# Patient Record
Sex: MALE | Race: WHITE | NOT HISPANIC OR LATINO | Employment: OTHER | URBAN - METROPOLITAN AREA
[De-identification: names, ages, dates, MRNs, and addresses within clinical notes are randomized per-mention and may not be internally consistent; named-entity substitution may affect disease eponyms.]

---

## 2018-01-12 NOTE — PROGRESS NOTES
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 00) (H15 109)   9  Hematuria (599 70) (R31 9)   10  Hyperlipidemia (272 4) (E78 5)   11  Hypertension (401 9) (I10)   12  Hypothyroidism (244 9) (E03 9)   13  Special screening examination for neoplasm of prostate (V76 44) (Z12 5)    Current Meds   1  Aspirin 81 MG Oral Tablet Delayed Release; Therapy: 11GTO5972 to Recorded   2  BD Insulin Syringe Ultrafine 31G X 15/64" 0 5 ML Miscellaneous; Therapy: 91UWZ5244 to Recorded   3  Clopidogrel Bisulfate 75 MG Oral Tablet; take 1 tablet by mouth daily; Therapy: 54NTI4172 to (Evaluate:27Kcn4544)  Requested for: 79DOX5467; Last   [id number] Ordered   4  Crestor 40 MG Oral Tablet; Therapy: 66FXY3735 to Recorded   5  Glucagon Emergency 1 MG Injection Kit; Therapy: 34YWU3843 to Recorded   6  Levothyroxine Sodium 50 MCG Oral Tablet; TAKE 1 TABLET DAILY; Therapy: 58FPJ6946 to (Evaluate:81Ovj0243)  Requested for: 35OVF6841; Last   Rx:2016 Ordered   7  Losartan Potassium 25 MG Oral Tablet; Take 1 tablet daily; Therapy: 76HQF7035 to ([phone])  Requested for: 23RFO6197; Last   Rx:34Uzi5050 Ordered   8  Metoprolol Tartrate 25 MG Oral Tablet; take 1 tablet by mouth twice a day; Therapy: 33MCN4359 to (Evaluate:26Ocq5117) Recorded   9  NovoLOG 100 UNIT/ML Subcutaneous Solution; Therapy: 57Wac0192 to [phone])  Requested for: 60XFA9858 Recorded   10  Omeprazole 20 MG Oral Capsule Delayed Release;  Take one capsule once daily before    eating; Therapy: 34FSC4532 to (Evaluate:82Uof7660)  Requested for: 52QOO9132; Last    Rx:04Jan2016 Ordered   11  OneTouch Ultra Blue In [employer]; Therapy: 14Apr2011 to [phone])  Requested for: 89AHK8177 Recorded    Allergies    1   No Known Drug Allergies    Plan  Diphtheria-pertussis-tetanus (DPT) vaccination administered at current visit    · Adacel 5-2-15 5 LF-MCG/0 5 Intramuscular Suspension    Signatures   Electronically signed by : KERRY Bradley ; Jul 5 2016  4:39PM EST                       (Author)

## 2018-09-19 ENCOUNTER — TELEPHONE (OUTPATIENT)
Dept: UROLOGY | Facility: AMBULATORY SURGERY CENTER | Age: 73
End: 2018-09-19

## 2018-09-19 NOTE — TELEPHONE ENCOUNTER
Called Dr Vasu Lucas office hoping that we would not require release since we have Care Everywhere with [facility] - but they still requested a release to be faxed  Still have not received release

## 2018-09-19 NOTE — TELEPHONE ENCOUNTER
----- Message from Checo Paredes sent at 9/14/2018  4:51 PM EDT -----  [facility]AS[facility] 2nd opinion CAP appt - former Dr Junita Severance PT  Release is to be sent over to [facility]   Fax to Dr Desiree Stacy office ([phone] fax and [phone] phone) and ask to fax records to [phone]

## 2018-09-20 NOTE — TELEPHONE ENCOUNTER
Left message for PT asking for a call back to see if he filled out release or if he could go to Dr Randy Roque office to fill out a release or get records

## 2018-09-25 NOTE — TELEPHONE ENCOUNTER
Release received and filled out/scanned into PT's chart under media  Release was faxed to [phone] by Patrizia Burnett on 9/24/18 with received confirmation  I've attempted to call Dr Bhavana Xiao office two times with no answer  I will try again to obtain records prior to appointment scheduled next week

## 2018-09-26 NOTE — TELEPHONE ENCOUNTER
Called [facility] and  stated that request was sent to [address]  Called ([phone]) and spoke with representative who stated there is a request being processed but she is unable to see if it is for our office  She asked I fax request again to [phone] and write that it is STAT and it is the 2nd request we are sending   I did so and also wrote to fax records to [phone]

## 2018-09-28 NOTE — TELEPHONE ENCOUNTER
Called [facility]  They stated they have nothing to do with the records other than submitting the request to [address]  I have called [facility] multiple times and they have stated they are being processed but we still have not received anything  Called PT and left a message asking if he could come in on 10/5/18 with Dr David Luz instead at 2:30pm because we need more time for them to send records  Waiting for call back to see if PT can move his appt

## 2018-10-02 ENCOUNTER — OFFICE VISIT (OUTPATIENT)
Dept: UROLOGY | Facility: CLINIC | Age: 73
End: 2018-10-02
Payer: MEDICARE

## 2018-10-02 VITALS — WEIGHT: 179 LBS | HEIGHT: 69 IN | BODY MASS INDEX: 26.51 KG/M2

## 2018-10-02 DIAGNOSIS — C61 MALIGNANT NEOPLASM OF PROSTATE (HCC): Primary | ICD-10-CM

## 2018-10-02 PROCEDURE — 99204 OFFICE O/P NEW MOD 45 MIN: CPT | Performed by: UROLOGY

## 2018-10-02 RX ORDER — ROSUVASTATIN CALCIUM 40 MG/1
TABLET, COATED ORAL
COMMUNITY
Start: 2014-02-14

## 2018-10-02 RX ORDER — OMEPRAZOLE 20 MG/1
CAPSULE, DELAYED RELEASE ORAL
COMMUNITY
Start: 2016-01-04

## 2018-10-02 RX ORDER — KETOCONAZOLE 20 MG/G
CREAM TOPICAL
COMMUNITY
Start: 2018-09-14 | End: 2021-11-29

## 2018-10-02 RX ORDER — CLOTRIMAZOLE AND BETAMETHASONE DIPROPIONATE 10; .64 MG/G; MG/G
CREAM TOPICAL
COMMUNITY
Start: 2018-02-19 | End: 2021-11-29

## 2018-10-02 RX ORDER — CHOLECALCIFEROL (VITAMIN D3) 125 MCG
50000 TABLET ORAL
COMMUNITY

## 2018-10-02 RX ORDER — LEVOTHYROXINE SODIUM 0.05 MG/1
TABLET ORAL
COMMUNITY
Start: 2018-09-14 | End: 2021-11-29 | Stop reason: SDUPTHER

## 2018-10-02 RX ORDER — CLOPIDOGREL BISULFATE 75 MG/1
TABLET ORAL
COMMUNITY
Start: 2017-12-01 | End: 2021-11-29 | Stop reason: SDUPTHER

## 2018-10-02 NOTE — LETTER
October 3, 2018     Dailyidalmis BowersDorina, [address] [facility] [address]  [address] [place] [address],[address] [facility]    Patient: Yeni Shankar   YOB: 1945   Date of Visit: 10/2/2018       Dear Dr Patrizia Pablo: Thank you for referring Brittney Anne to me for evaluation  Below are my notes for this consultation  If you have questions, please do not hesitate to call me  I look forward to following your patient along with you  Sincerely,        Silva Cao MD        CC: No Recipients  Silva Cao MD  10/3/2018  8:33 AM  Sign at close encounter  Progress Note - Urology  Yeni Shankar 68 y o  male MRN: [id number]  Encounter: [id number]      Chief Complaint:   Chief Complaint   Patient presents with    Other     New patient - 2nd Opinion (prostate cancer)    Other     Pt was not able to give a urine sample today  HPI:   this is a 56-year-old male who was referred to Urology for elevated PSA of 4 9  Subsequently evaluation revealed [place] 7 adenocarcinoma of the prostate with 3 of 12 cores  His Prolaris test indicating intermediate risk  AUA risk profile also intermediate  He has additional single core of questionable significance as well  He has multiple comorbidities  Including insulin-dependent diabetes and coronary artery disease  He states that there is erectile dysfunction at this time  He does not have a partner  In addition he has voiding dysfunction with episodes of urgency incontinence  He denies weight loss or bone pain  Staging studies indicate no evidence of arie disease  The prostate cancer appears organ confined  Options of management have been detailed previously to this patient by his initial urologist  He is a retired  and expresses some indication that he understands medical terminology to some degree  In view of the pathologic findings I did not think he was best suited for active surveillance    We again reviewed options of radiation therapy which would include androgen deprivation therapy which was again outlined to the patient with the side effects and risks associated with that  We talked about standard IMRT as well as proton beam therapy as well as CyberKnife therapy  I stated that I felt that he was a higher risk for brachytherapy  He also brought up the possibility of high intensity focused ultrasound  This modality is not available in the [place] on a standard basis  We briefly talked about cryoablation as well  Finally, we discussed surgical intervention with a laparoscopic robotic prostatectomy  We discussed the risks and complications thereof  We discussed the sexual dysfunction which he already displays  We discussed urinary incontinence again, a which is a problem that he has with urgency leakage  He has not had a full evaluation for this  Although cystoscopy did not indicate significant obstruction  He has not had urodynamic studies  We discussed this briefly as well  He is aware of cancer statistics, he did bring a copy of the narrative from his initial urologist which details statistics at length  We discussed that should he be a surgical candidate he would need medical clearance from cardiology as well as Internal Medicine  Opportunity was given for questions and review of his pathology and treatment  He expressed concern of a need for colonoscopy as well as luis anal irritation  I encouraged him to have a colorectal opinion in this regard  I also encouraged him to discuss radiation therapy with that specialty as well  Some of his concern in that regard is that he has bowel dysfunction with episodic diarrhea        MEDS:    Current Outpatient Prescriptions:     aspirin 81 MG tablet, Take 81 mg by mouth, Disp: , Rfl:     clopidogrel (PLAVIX) 75 mg tablet, TAKE 1 TABLET BY MOUTH EVERY DAY, Disp: , Rfl:     clotrimazole-betamethasone (LOTRISONE) 1-0 05 % cream, Apply topically, Disp: , Rfl:     Dapagliflozin Propanediol 10 MG TABS, Take 10 mg by mouth, Disp: , Rfl:     glucagon (GLUCAGON EMERGENCY) 1 MG injection, Inject as directed, Disp: , Rfl:     insulin aspart (NovoLOG) 100 units/mL injection, Inject 100 Units/mL under the skin, Disp: , Rfl:     ketoconazole (NIZORAL) 2 % cream, Apply topically, Disp: , Rfl:     levothyroxine 50 mcg tablet, 1 tablet daily- 2 pills Saturday and sunday, Disp: , Rfl:     metoprolol tartrate (LOPRESSOR) 25 mg tablet, Take 25 mg by mouth, Disp: , Rfl:     omeprazole (PRILOSEC) 20 mg delayed release capsule, Reported on 2/14/2017 , Disp: , Rfl:     rosuvastatin (CRESTOR) 40 MG tablet, Take by mouth, Disp: , Rfl:     Ergocalciferol (VITAMIN D2) 2000 units TABS, Take 50,000 Units by mouth, Disp: , Rfl:       PMH:  No past medical history on file  PSH  No past surgical history on file  ROS:  Review of Systems      Vitals:  Height 5' 9" (1 753 m), weight 81 2 kg (179 lb)  Physical Exam:     General Appearance    80-year-old male appearing his stated age in no obvious distress at this time  We did not proceed with a physical examination at this time because of his request to avoid rectal examination in view of his perianal problems at this time  Lab, Imaging and other studies:  No results found for this or any previous visit (from the past 672 hour(s))  IMPRESSION:    T1c [place]elia 7 adenocarcinoma of the prostate    PLAN:   recommendation is to consider treatment for this disease  Recommendation to obtain cardiac and medical clearance which may help him make a decision on management as he has not committed to a treatment modality at this time  He will obtain a colorectal consult  40 minutes the time was spent in review of his records which he presented at the time of his visit  We then discussed the presentation, the pathology, and options of management after review of these records  I counseled the patient regarding opinion for treatment    The patient was given opportunity for review and questions  It is of note that my opinion regarding treatment is similar to that of his initial recommendation  He does display increased risk for surgical management but if cleared medically he may wish to follow this treatment pathway

## 2018-10-03 PROBLEM — E11.9 DIABETES (HCC): Status: ACTIVE | Noted: 2018-10-03

## 2018-10-03 PROBLEM — I51.9 HEART DISEASE: Status: ACTIVE | Noted: 2018-10-03

## 2018-10-03 NOTE — PROGRESS NOTES
Progress Note - Urology  Dayanara Ramsay 68 y o  male MRN: [id number]  Encounter: [id number]      Chief Complaint:   Chief Complaint   Patient presents with    Other     New patient - 2nd Opinion (prostate cancer)    Other     Pt was not able to give a urine sample today  HPI:   this is a 80-year-old male who was referred to Urology for elevated PSA of 4 9  Subsequently evaluation revealed [place] 7 adenocarcinoma of the prostate with 3 of 12 cores  His Prolaris test indicating intermediate risk  AUA risk profile also intermediate  He has additional single core of questionable significance as well  He has multiple comorbidities  Including insulin-dependent diabetes and coronary artery disease  He states that there is erectile dysfunction at this time  He does not have a partner  In addition he has voiding dysfunction with episodes of urgency incontinence  He denies weight loss or bone pain  Staging studies indicate no evidence of arie disease  The prostate cancer appears organ confined  Options of management have been detailed previously to this patient by his initial urologist  He is a retired  and expresses some indication that he understands medical terminology to some degree  In view of the pathologic findings I did not think he was best suited for active surveillance  We again reviewed options of radiation therapy which would include androgen deprivation therapy which was again outlined to the patient with the side effects and risks associated with that  We talked about standard IMRT as well as proton beam therapy as well as CyberKnife therapy  I stated that I felt that he was a higher risk for brachytherapy  He also brought up the possibility of high intensity focused ultrasound  This modality is not available in the [place] on a standard basis  We briefly talked about cryoablation as well    Finally, we discussed surgical intervention with a laparoscopic robotic prostatectomy  We discussed the risks and complications thereof  We discussed the sexual dysfunction which he already displays  We discussed urinary incontinence again, a which is a problem that he has with urgency leakage  He has not had a full evaluation for this  Although cystoscopy did not indicate significant obstruction  He has not had urodynamic studies  We discussed this briefly as well  He is aware of cancer statistics, he did bring a copy of the narrative from his initial urologist which details statistics at length  We discussed that should he be a surgical candidate he would need medical clearance from cardiology as well as Internal Medicine  Opportunity was given for questions and review of his pathology and treatment  He expressed concern of a need for colonoscopy as well as luis anal irritation  I encouraged him to have a colorectal opinion in this regard  I also encouraged him to discuss radiation therapy with that specialty as well  Some of his concern in that regard is that he has bowel dysfunction with episodic diarrhea        MEDS:    Current Outpatient Prescriptions:     aspirin 81 MG tablet, Take 81 mg by mouth, Disp: , Rfl:     clopidogrel (PLAVIX) 75 mg tablet, TAKE 1 TABLET BY MOUTH EVERY DAY, Disp: , Rfl:     clotrimazole-betamethasone (LOTRISONE) 1-0 05 % cream, Apply topically, Disp: , Rfl:     Dapagliflozin Propanediol 10 MG TABS, Take 10 mg by mouth, Disp: , Rfl:     glucagon (GLUCAGON EMERGENCY) 1 MG injection, Inject as directed, Disp: , Rfl:     insulin aspart (NovoLOG) 100 units/mL injection, Inject 100 Units/mL under the skin, Disp: , Rfl:     ketoconazole (NIZORAL) 2 % cream, Apply topically, Disp: , Rfl:     levothyroxine 50 mcg tablet, 1 tablet daily- 2 pills Saturday and sunday, Disp: , Rfl:     metoprolol tartrate (LOPRESSOR) 25 mg tablet, Take 25 mg by mouth, Disp: , Rfl:     omeprazole (PRILOSEC) 20 mg delayed release capsule, Reported on 2/14/2017 , Disp: , Rfl:     rosuvastatin (CRESTOR) 40 MG tablet, Take by mouth, Disp: , Rfl:     Ergocalciferol (VITAMIN D2) 2000 units TABS, Take 50,000 Units by mouth, Disp: , Rfl:       PMH:  No past medical history on file  PSH  No past surgical history on file  ROS:  Review of Systems      Vitals:  Height 5' 9" (1 753 m), weight 81 2 kg (179 lb)  Physical Exam:     General Appearance    67-year-old male appearing his stated age in no obvious distress at this time  We did not proceed with a physical examination at this time because of his request to avoid rectal examination in view of his perianal problems at this time  Lab, Imaging and other studies:  No results found for this or any previous visit (from the past 672 hour(s))  IMPRESSION:    T1c [place] 7 adenocarcinoma of the prostate    PLAN:   recommendation is to consider treatment for this disease  Recommendation to obtain cardiac and medical clearance which may help him make a decision on management as he has not committed to a treatment modality at this time  He will obtain a colorectal consult  40 minutes the time was spent in review of his records which he presented at the time of his visit  We then discussed the presentation, the pathology, and options of management after review of these records  I counseled the patient regarding opinion for treatment  The patient was given opportunity for review and questions  It is of note that my opinion regarding treatment is similar to that of his initial recommendation  He does display increased risk for surgical management but if cleared medically he may wish to follow this treatment pathway

## 2018-10-22 ENCOUNTER — LAB REQUISITION (OUTPATIENT)
Dept: LAB | Facility: HOSPITAL | Age: 73
End: 2018-10-22
Payer: MEDICARE

## 2018-10-22 DIAGNOSIS — K57.30 DIVERTICULOSIS OF LARGE INTESTINE WITHOUT PERFORATION OR ABSCESS WITHOUT BLEEDING: ICD-10-CM

## 2018-10-22 DIAGNOSIS — K55.20 ANGIODYSPLASIA OF COLON WITHOUT HEMORRHAGE: ICD-10-CM

## 2018-10-22 DIAGNOSIS — Z83.71 FAMILY HISTORY OF COLONIC POLYPS: ICD-10-CM

## 2018-10-22 DIAGNOSIS — Z86.010 HISTORY OF COLONIC POLYPS: ICD-10-CM

## 2018-10-22 PROCEDURE — 88305 TISSUE EXAM BY PATHOLOGIST: CPT | Performed by: PATHOLOGY

## 2018-11-02 ENCOUNTER — OFFICE VISIT (OUTPATIENT)
Dept: UROLOGY | Facility: CLINIC | Age: 73
End: 2018-11-02
Payer: MEDICARE

## 2018-11-02 VITALS
BODY MASS INDEX: 26.96 KG/M2 | HEIGHT: 69 IN | SYSTOLIC BLOOD PRESSURE: 138 MMHG | DIASTOLIC BLOOD PRESSURE: 76 MMHG | WEIGHT: 182 LBS | HEART RATE: 50 BPM

## 2018-11-02 DIAGNOSIS — C61 MALIGNANT NEOPLASM OF PROSTATE (HCC): Primary | ICD-10-CM

## 2018-11-02 PROCEDURE — 99213 OFFICE O/P EST LOW 20 MIN: CPT | Performed by: UROLOGY

## 2018-11-02 RX ORDER — INFLUENZA A VIRUSA/MICHIGAN/45/2015 X-275 (H1N1) ANTIGEN (FORMALDEHYDE INACTIVATED), INFLUENZA A VIRUS A/HONG KONG/4801/2014 X-263B (H3N2) ANTIGEN (FORMALDEHYDE INACTIVATED), AND INFLUENZA B VIRUS B/BRISBANE/60/2008 ANTIGEN (FORMALDEHYDE INACTIVATED) 60; 60; 60 UG/.5ML; UG/.5ML; UG/.5ML
INJECTION, SUSPENSION INTRAMUSCULAR
Refills: 0 | COMMUNITY
Start: 2018-09-29 | End: 2019-03-27 | Stop reason: ALTCHOICE

## 2018-11-02 RX ORDER — LOSARTAN POTASSIUM 25 MG/1
25 TABLET ORAL 2 TIMES DAILY
Refills: 1 | COMMUNITY
Start: 2018-10-22

## 2018-11-02 NOTE — PROGRESS NOTES
Progress Note - Urology  Ariana Jesup Sobia 68 y o  male MRN: [id number]  Encounter: [id number]      Chief Complaint:   Chief Complaint   Patient presents with    Prostate Cancer     Follow Up - Review colonoscopy results    Other     Pt was not able to give a urine sample today  HPI:   79-year-old male with history of prostate cancer presents for follow-up discussion  He has not yet been able to decide on treatment  We had again reviewed the options  This includes 1  Active surveillance in which case I would follow him on a 4 to six-month basis with digital rectal examination and PSA  Pending these results the decision would be re-evaluated  2  Radiation therapy  Again this was reviewed and he was offered to see radiation oncology for further discussion  3  Surgical intervention  We discussed the risk and complications again with this  Opportunity was given for him to question and he was given answers for those questions    He is not in any immediate distress at this time      MEDS:    Current Outpatient Prescriptions:     aspirin 81 MG tablet, Take 81 mg by mouth, Disp: , Rfl:     clopidogrel (PLAVIX) 75 mg tablet, TAKE 1 TABLET BY MOUTH EVERY DAY, Disp: , Rfl:     clotrimazole-betamethasone (LOTRISONE) 1-0 05 % cream, Apply topically, Disp: , Rfl:     Dapagliflozin Propanediol 10 MG TABS, Take 10 mg by mouth, Disp: , Rfl:     Ergocalciferol (VITAMIN D2) 2000 units TABS, Take 50,000 Units by mouth, Disp: , Rfl:     FLUZONE HIGH-DOSE 0 5 ML KOJO, TO BE ADMINISTERED BY PHARMACIST FOR IMMUNIZATION, Disp: , Rfl: 0    glucagon (GLUCAGON EMERGENCY) 1 MG injection, Inject as directed as needed  , Disp: , Rfl:     insulin aspart (NovoLOG) 100 units/mL injection, Inject 100 Units/mL under the skin Pump , Disp: , Rfl:     ketoconazole (NIZORAL) 2 % cream, Apply topically, Disp: , Rfl:     levothyroxine 50 mcg tablet, 1 tablet daily- 2 pills Saturday and sunday, Disp: , Rfl:     losartan (COZAAR) 25 mg tablet, Take 25 mg by mouth 2 (two) times a day, Disp: , Rfl: 1    metoprolol tartrate (LOPRESSOR) 25 mg tablet, Take 25 mg by mouth, Disp: , Rfl:     rosuvastatin (CRESTOR) 40 MG tablet, Take by mouth, Disp: , Rfl:     omeprazole (PRILOSEC) 20 mg delayed release capsule, Reported on 2/14/2017 , Disp: , Rfl:       PMH:  No past medical history on file  PSH  No past surgical history on file  ROS:  Review of Systems      Vitals:  Blood pressure 138/76, pulse (!) 50, height 5' 9" (1 753 m), weight 82 6 kg (182 lb)  Physical Exam:    no clinical examination performed at this time  The patient presented for discussion of his options  It review of the pathology and questions regarding treatment       Lab, Imaging and other studies:  Recent Results (from the past 672 hour(s))   Tissue Exam    Collection Time: 10/22/18  8:00 AM   Result Value Ref Range    Case Report       Surgical Pathology Report                         Case: [id number]                                   Authorizing Provider:  Eloina Valdivia MD      Collected:           10/22/2018 0800              Pathologist:           Finn Mott MD            Received:            10/22/2018 1430              Specimens:   A) - Large Intestine, Sigmoid Colon                                                                 B) - Rectum                                                                                Final Diagnosis       A  Large Intestine, Sigmoid Colon, :  -Fragments of tubular adenoma   -No evidence of high grade dysplasia or malignancy    B  Rectum, :  -Hyperplastic polyp   -No evidence of adenomatous change or malignancy  Note       Interpretation performed at [facility], [address] [facility]      Additional Information       All controls performed with the immunohistochemical stains reported above reacted appropriately    These tests were developed and their performance characteristics determined by [facility] or [facility]  They may not be cleared or approved by the [place]  Food and Drug Administration  The FDA has determined that such clearance or approval is not necessary  These tests are used for clinical purposes  They should not be regarded as investigational or for research  This laboratory has been approved by CLIA 88, designated as a high-complexity laboratory and is qualified to perform these tests  Gross Description       A  The specimen is received in formalin, labeled with the patient's name and hospital number, and is designated "sigmoid colon polyp  The specimen consists of 2 tan-pink, rubbery, irregularly shaped, focally fragmented polypoid portions of tissue measuring 0 2 and 0 8 cm in greatest dimension  The specimen is entirely submitted in 1 cassette  B  The specimen is received in formalin, labeled with the patient's name and hospital number, and is designated "rectal polyp  The specimen consists of a single tan-pink, rubbery, irregularly shaped polypoid portion of tissue measuring 0 3 cm in greatest dimension  The specimen is entirely submitted in 1 cassette  Note: The estimated total formalin fixation time based upon information provided by the submitting clinician and the standard processing schedule is under 72 hours  David             IMPRESSION:   Camelia 7 T1c adenocarcinoma the prostate    PLAN:   again, having reviewed options the patient will take this under advisement  I will recheck him in 4 months with a PSA prior to visit  PSA was 4 9 at the time of diagnosis

## 2019-03-04 ENCOUNTER — OFFICE VISIT (OUTPATIENT)
Dept: UROLOGY | Facility: CLINIC | Age: 74
End: 2019-03-04
Payer: MEDICARE

## 2019-03-04 VITALS
SYSTOLIC BLOOD PRESSURE: 118 MMHG | WEIGHT: 181 LBS | HEIGHT: 69 IN | DIASTOLIC BLOOD PRESSURE: 70 MMHG | BODY MASS INDEX: 26.81 KG/M2 | HEART RATE: 60 BPM

## 2019-03-04 DIAGNOSIS — C61 MALIGNANT NEOPLASM OF PROSTATE (HCC): Primary | ICD-10-CM

## 2019-03-04 PROCEDURE — 99213 OFFICE O/P EST LOW 20 MIN: CPT | Performed by: PHYSICIAN ASSISTANT

## 2019-03-04 NOTE — PROGRESS NOTES
UROLOGY PROGRESS NOTE   Patient Identifiers: Farzaneh Nolen (MRN [id number])  Date of Service: 3/4/2019    Subjective:      80-year-old man with a history of prostate cancer  He had a prostate biopsy in 2018 which showed 3 cores positive for adenocarcinoma  One was 3+ 4 equal 7 involving 20 percent of the specimen  He other 2 were Camelia 6 involving 4 percent and 90 percent of the specimen  His Prolaris  score was intermediate  After discussions with Dr Pema Zhu and Dr Trip Ramey he has not chosen any treatment at this time  His current PSA is 5 44  Previous PSA was 4 90  His CT scan showed small bladder stones  as well  Patient has  no complaints  Objective:     VITALS:    Vitals:    03/04/19 1057   BP: 118/70   Pulse: 60     AUA SYMPTOM SCORE      Most Recent Value   AUA SYMPTOM SCORE   How often have you had a sensation of not emptying your bladder completely after you finished urinating? 2   How often have you had to urinate again less than two hours after you finished urinating? 2   How often have you found you stopped and started again several times when you urinate? 2   How often have you found it difficult to postpone urination? 0   How often have you had a weak urinary stream?  0   How often have you had to push or strain to begin urination? 0   How many times did you most typically get up to urinate from the time you went to bed at night until the time you got up in the morning?   3   Quality of Life: If you were to spend the rest of your life with your urinary condition just the way it is now, how would you feel about that?  3   AUA SYMPTOM SCORE  9            LABS:  No results found for: HGB, HCT, WBC, PLT]    No results found for: NA, K, CL, CO2, BUN, CREATININE, CALCIUM, GLUCOSE]        INPATIENT MEDS:    Current Outpatient Medications:     aspirin 81 MG tablet, Take 81 mg by mouth, Disp: , Rfl:     clopidogrel (PLAVIX) 75 mg tablet, TAKE 1 TABLET BY MOUTH EVERY DAY, Disp: , Rfl:     Dapagliflozin Propanediol 10 MG TABS, Take 10 mg by mouth, Disp: , Rfl:     glucagon (GLUCAGON EMERGENCY) 1 MG injection, Inject as directed as needed  , Disp: , Rfl:     insulin aspart (NovoLOG) 100 units/mL injection, Inject 100 Units/mL under the skin Pump , Disp: , Rfl:     ketoconazole (NIZORAL) 2 % cream, Apply topically, Disp: , Rfl:     levothyroxine 50 mcg tablet, 1 tablet daily- 2 pills Saturday and sunday, Disp: , Rfl:     losartan (COZAAR) 25 mg tablet, Take 25 mg by mouth 2 (two) times a day, Disp: , Rfl: 1    metoprolol tartrate (LOPRESSOR) 25 mg tablet, Take 25 mg by mouth, Disp: , Rfl:     omeprazole (PRILOSEC) 20 mg delayed release capsule, Reported on 2/14/2017 , Disp: , Rfl:     rosuvastatin (CRESTOR) 40 MG tablet, Take by mouth, Disp: , Rfl:     clotrimazole-betamethasone (LOTRISONE) 1-0 05 % cream, Apply topically, Disp: , Rfl:     Ergocalciferol (VITAMIN D2) 2000 units TABS, Take 50,000 Units by mouth, Disp: , Rfl:     FLUZONE HIGH-DOSE 0 5 ML KOJO, TO BE ADMINISTERED BY PHARMACIST FOR IMMUNIZATION, Disp: , Rfl: 0      Physical Exam:   /70 (BP Location: Left arm, Patient Position: Sitting, Cuff Size: Adult)   Pulse 60   Ht 5' 9" (1 753 m)   Wt 82 1 kg (181 lb)   BMI 26 73 kg/m²   GEN: no acute distress    RESP: breathing comfortably with no accessory muscle use    ABD: soft, non-tender, non-distended   INCISION:    EXT: no significant peripheral edema       RADIOLOGY:     none    Assessment:     1  Prostate cancer    Plan:   - we discussed the options of management of his prostate cancer  - he would be a candidate for surgery, IMRT with short course of ADT or possibly cyberknife     - I gave him an appointment in 4 months with a PSA prior to visit  - if he cannot decide on treatment another option would be to re-biopsy him at that time to assess for advancement of disease

## 2019-03-15 ENCOUNTER — TELEPHONE (OUTPATIENT)
Dept: UROLOGY | Facility: MEDICAL CENTER | Age: 74
End: 2019-03-15

## 2019-03-15 NOTE — TELEPHONE ENCOUNTER
Pt calling wants to discuss robotic surgery w Dr Estrada Earing- visited [address] [place] and stated it should be soon  Please advise    [phone]

## 2019-03-15 NOTE — TELEPHONE ENCOUNTER
Patient of [place]  Patient states that Dr Jarek Vallejo (PCP) recommended patient have surgery for prostate cancer  Patient does not want to wait for Dr Bhargavi Ortega to return  Advised patient that he could see one of the other physicians in Urology group to discuss surgery in the meantime  Patient was agreeable to this plan  Per patient, he only wants to be seen in [address] [place] [address] office  Appointment scheduled for patient and office information given

## 2019-03-25 ENCOUNTER — TELEPHONE (OUTPATIENT)
Dept: UROLOGY | Facility: AMBULATORY SURGERY CENTER | Age: 74
End: 2019-03-25

## 2019-03-25 ENCOUNTER — OFFICE VISIT (OUTPATIENT)
Dept: UROLOGY | Facility: AMBULATORY SURGERY CENTER | Age: 74
End: 2019-03-25
Payer: MEDICARE

## 2019-03-25 ENCOUNTER — TELEPHONE (OUTPATIENT)
Dept: UROLOGY | Facility: MEDICAL CENTER | Age: 74
End: 2019-03-25

## 2019-03-25 VITALS
HEIGHT: 69 IN | SYSTOLIC BLOOD PRESSURE: 132 MMHG | BODY MASS INDEX: 26.66 KG/M2 | HEART RATE: 74 BPM | WEIGHT: 180 LBS | DIASTOLIC BLOOD PRESSURE: 84 MMHG

## 2019-03-25 DIAGNOSIS — C61 MALIGNANT NEOPLASM OF PROSTATE (HCC): Primary | ICD-10-CM

## 2019-03-25 PROCEDURE — 99215 OFFICE O/P EST HI 40 MIN: CPT | Performed by: UROLOGY

## 2019-03-25 NOTE — PROGRESS NOTES
3/25/2019    Vasquez Ramsay  1945  [id number]        Assessment  Camelia 7 prostate cancer    Plan  An extended discussion with the patient today regarding his diagnosis  We reviewed all of his prior records from [facility] and [facility]  All testing was done at [facility]  We reviewed his current prostate cancer stage as well as nomogram results and other tests that were performed  He understands these  We discussed treatment options including observation, radiation therapy, and surgical management  He also asked about cryotherapy which we discussed, but he now realizes is not an option for primary treatment but may be for salvage if necessary  Discussed technique, risks, benefits for robotic radical prostatectomy  He understands that he is at risk due to his uncontrolled diabetes for poor healing of his wounds as well as more importantly his anastomosis  Discussed typical risks of urinary incontinence and impotence  He did have a prior finding of small bladder calculi indicating poor emptying and possibly the beginning of neurogenic bladder  He reports good sexual function, though does have advanced uncontrolled diabetes  We discussed surgery and risks in detail and at length  Also discussed risks associated with radiation therapy  He has been very and size of with both his prior urologists  His family physician however has suggested that he make a decision and proceed with treatment  He is still unsure  I do think that there is added risk due to his comorbidities  He would like to consider radiation therapy again but at [facility]  I am referring him to [address] [place] [address]  He understands that this would be for external beam radiation therapy and not alternate forms of radiation treatments  All questions answered to his satisfaction  He has not yet made up his mind, and may at the side to proceed with surgery at increased risk    He does understand that he would require cardiology evaluation and clearance prior to proceeding with surgery  I would also suggest endocrinology evaluation for management of his insulin pump if he desires surgery  Total visit time was 40 minutes of which over 50% was spent on counseling  History of Present Illness  Spring Fairchild is a 68 y o  male diagnosed with [place] 7 prostate cancer due to elevated PSA 5 8  He was seeing Dr Bairon Neal in early 9120 when prostate biopsy revealed 3/12 cores [place] 7 (3+4)  He discussed treatment options at that time, and also discussed radiation therapy with radiation oncologist at [facility]  He then sought a second opinion with Dr Radha Higgins, as he was unsatisfied with Dr Bairon Neal  Prolaris score was consistent with intermediate risk prostate cancer  He had a CT scan which did not reveal any evidence of metastasis  AUA symptom score was 25  SEA 23  [facility] nomogram 51% organ confined, 48% extracapsular extension, 2% lymph node involvement, 2% seminal vesicle involvement  His medical history significant for uncontrolled diabetes with insulin pump, coronary disease, COPD  He underwent cystoscopy at [facility] revealing small bladder calculi and mildly obstructing prostate, as well as prostate biopsy revealing prostate volume 36 mL    His brother underwent robotic prostatectomy about 11 years ago  They have not really discussed specifics  Review of Systems  Review of Systems   Constitutional: Negative  HENT: Negative  Respiratory: Negative  Cardiovascular: Negative  Gastrointestinal: Negative  Genitourinary:        As per HPI   Musculoskeletal: Negative  Skin: Negative  Neurological: Negative  Hematological: Negative            Past Medical History  Past Medical History:   Diagnosis Date    Acid reflux     Diabetes ([address] )     Fungus infection     Rectal fungus    Heart disease     High cholesterol     Hypertension     Hypothyroidism Past Social History  Past Surgical History:   Procedure Laterality Date    BREAST CYST EXCISION      CARDIAC SURGERY      STENT    COLONOSCOPY         Past Family History  Family History   Problem Relation Age of Onset    No Known Problems Father     No Known Problems Mother        Past Social history  Social History     Socioeconomic History    Marital status:      Spouse name: Not on file    Number of children: Not on file    Years of education: Not on file    Highest education level: Not on file   Occupational History    Not on file   Social Needs    Financial resource strain: Not on file    Food insecurity:     Worry: Not on file     Inability: Not on file    Transportation needs:     Medical: Not on file     Non-medical: Not on file   Tobacco Use    Smoking status: Current Every Day Smoker     Packs/day: 0 25     Types: Cigarettes    Smokeless tobacco: Never Used   Substance and Sexual Activity    Alcohol use: Yes     Comment: Occasional wine    Drug use: No    Sexual activity: Not on file   Lifestyle    Physical activity:     Days per week: Not on file     Minutes per session: Not on file    Stress: Not on file   Relationships    Social connections:     Talks on phone: Not on file     Gets together: Not on file     Attends Druze service: Not on file     Active member of club or organization: Not on file     Attends meetings of clubs or organizations: Not on file     Relationship status: Not on file    Intimate partner violence:     Fear of current or ex partner: Not on file     Emotionally abused: Not on file     Physically abused: Not on file     Forced sexual activity: Not on file   Other Topics Concern    Not on file   Social History Narrative    Not on file     Social History     Tobacco Use   Smoking Status Current Every Day Smoker    Packs/day: 0 25    Types: Cigarettes   Smokeless Tobacco Never Used       Current Medications  Current Outpatient Medications Medication Sig Dispense Refill    aspirin 81 MG tablet Take 81 mg by mouth      clopidogrel (PLAVIX) 75 mg tablet TAKE 1 TABLET BY MOUTH EVERY DAY      Dapagliflozin Propanediol 10 MG TABS Take 10 mg by mouth      Ergocalciferol (VITAMIN D2) 2000 units TABS Take 50,000 Units by mouth      FLUZONE HIGH-DOSE 0 5 ML KOJO TO BE ADMINISTERED BY PHARMACIST FOR IMMUNIZATION  0    glucagon (GLUCAGON EMERGENCY) 1 MG injection Inject as directed as needed        insulin aspart (NovoLOG) 100 units/mL injection Inject 100 Units/mL under the skin Pump       ketoconazole (NIZORAL) 2 % cream Apply topically      levothyroxine 50 mcg tablet 1 tablet daily- 2 pills Saturday and sunday      losartan (COZAAR) 25 mg tablet Take 25 mg by mouth 2 (two) times a day  1    metoprolol tartrate (LOPRESSOR) 25 mg tablet Take 25 mg by mouth      omeprazole (PRILOSEC) 20 mg delayed release capsule Reported on 2/14/2017       rosuvastatin (CRESTOR) 40 MG tablet Take by mouth      clotrimazole-betamethasone (LOTRISONE) 1-0 05 % cream Apply topically       No current facility-administered medications for this visit  Allergies  No Known Allergies    Past Medical History, Social History, Family History, medications and allergies were reviewed  Vitals  Vitals:    03/25/19 1100   BP: 132/84   BP Location: Left arm   Patient Position: Sitting   Cuff Size: Adult   Pulse: 74   Weight: 81 6 kg (180 lb)   Height: 5' 9" (1 753 m)       Physical Exam  Physical Exam   Constitutional: He is oriented to person, place, and time  He appears well-developed and well-nourished  Cardiovascular: Normal rate  Pulmonary/Chest: Effort normal    Abdominal: Soft  Musculoskeletal: Normal range of motion  Neurological: He is alert and oriented to person, place, and time  Skin: Skin is warm, dry and intact  Psychiatric: He has a normal mood and affect  Vitals reviewed          Results  No results found for: PSA  No results found for: GLUCOSE, CALCIUM, NA, K, CO2, CL, BUN, CREATININE  No results found for: WBC, HGB, HCT, MCV, PLT

## 2019-03-25 NOTE — TELEPHONE ENCOUNTER
Return in about 1 month (around 4/25/2019) for radiation referral, then f/u here    PT PREFERS [facility] OFFICE     RAD ONC APPT SCHED FOR 03/27/19

## 2019-03-25 NOTE — TELEPHONE ENCOUNTER
Soinya Engle from Radiation calling to state she does not see patient's cat scan record in his chart as per notes  Soniya Engle states she looked in media as well    Please call Soniya Engle back at [phone]

## 2019-03-27 ENCOUNTER — RADIATION ONCOLOGY CONSULT (OUTPATIENT)
Dept: RADIATION ONCOLOGY | Facility: HOSPITAL | Age: 74
End: 2019-03-27
Attending: RADIOLOGY
Payer: MEDICARE

## 2019-03-27 VITALS
RESPIRATION RATE: 18 BRPM | DIASTOLIC BLOOD PRESSURE: 72 MMHG | BODY MASS INDEX: 26.79 KG/M2 | WEIGHT: 181.4 LBS | SYSTOLIC BLOOD PRESSURE: 116 MMHG | OXYGEN SATURATION: 98 % | HEART RATE: 74 BPM | TEMPERATURE: 97.4 F

## 2019-03-27 DIAGNOSIS — C61 MALIGNANT NEOPLASM OF PROSTATE (HCC): Primary | ICD-10-CM

## 2019-03-27 PROCEDURE — 99215 OFFICE O/P EST HI 40 MIN: CPT | Performed by: RADIOLOGY

## 2019-03-27 NOTE — PROGRESS NOTES
María Elena Ladd  1945  Mr Viki Campbell is a 68 y o  male    Chief Complaint   Patient presents with    Consult     Radiation Oncology       Cancer Staging  No matching staging information was found for the patient  Oncology History    Patient presents for RT consult for prostate cancer, referred by Dr Yolanda Peña     68year old male initially followed by [facility], Dr Raquel Cash with a rising PSA at 4 9 in December 2017  He underwent prostate biopsy at [facility] on 1/16/18 revealed [place] 7 adenocarcinoma of the prostate with 3 of 12 cores  His Prolaris score 3 8 indicating intermediate risk  AUA risk profile also intermediate  He had additional single core of questionable significance as well  3/27/18 Consult with Dr Madhuri Mckenna, Radiation Oncology at [facility] - discussed radiation therapy  Pt undecided if he wanted to proceed with prostatectomy or radiation  Pt did not follow-up with [facility] to proceed with any treatments  10/2/18 Consult with Urology, Dr Hugh Hernandez at [address] referred for 2nd opinion  Do not recommend active surveillance  Radiation therapy options were again reviewed including ADT  11/2/18 Dr Hugh Hernandez follow-up, patient has not yet made a decision on treatment  Reviewed options again including active surveillance, RT, Surgical intervention  Pt will f/u in 4 months with repeat PSA       3/11/19 PSA 5 83  2/2/19 5 44  12/1/17 4 90  5/29/15 3 19    3/25/19 Dr Yolanda Peña, Urology  Discussed treatment options including observation, radiation therapy, and surgical management  He also asked about cryotherapy, but he now realizes is not an option for primary treatment but may be for salvage if necessary  Pt still unsure on treatment  He would like to consider radiation at [facility]  He understands this would be external beam RT and not alternate forms of radiation         4/29/19 Dr Yolanda Peña follow-up          Malignant neoplasm of prostate [facility])    1/2018 Initial Diagnosis Malignant neoplasm of prostate ([facility] )         1/16/2018 Biopsy     [facility]NL (Dr Ani Holland, [facility] urology)    Consistent with Average AUA Intermediate Risk  Prolaris Score:  3 8     A  Right base lateral prostate biopsy:  Negative for malignancy  High grade PIN present  B  Right base medial prostate biopsy:  Negative for malignancy  High grade PIN present  C  Right mid lateral prostate biopsy:  Negative for malignancy  D  Right midmedial prostate biopsy:  Negative for malignancy  E  Right [place] lateral prostate biopsy:  Negative for malignancy  F  Right [place] medial prostate biopsy:  Negative for malignancy  G  Left base lateral prostate biopsy:  Negative for malignancy  H  Left base medial prostate biopsy:  Adenocarcinoma, Kamrar 7 (3+4)    Linear tumor dimension is 4 5 mm  Tumor involves approximately 20% of biopsy core   Perineural tumor  invasion identified  I  Left mid lateral prostate biopsy:  Adenocarcinoma, Kamrar 6 (3+3)    Linear tumor dimension-0 8 mm  Tumor involves approximately 4% of biopsy core  Immunohistochemical staining with PCA supports this diagnosis       J  Left midmedial prostate biopsy:  Adenocarcinoma, Kamrar 6 (3+3)    Linear tumor dimension-2 2 mm  Tumor involves approximately 9% of biopsy core  Immunohistochemical staining with PCA supports this diagnosis         K  Left apex lateral prostate biopsy:  Single atypical gland present   Cannot rule out adenocarcinoma versus  branching PIN    Gland not represented for evaluation on  immunohistochemical staining with PCA       L  Left apex medial prostate biopsy:  Negative for malignancy        Synoptic Report:  Primary Specimen:   H: PROSTATE NEEDLE BIOPSY, LEFT BASE MEDIAL  Synoptic Report Includes:  Specimens A through L  Tumor       Histologic Type:  Adenocarcinoma (acinar, not otherwise  specified)       Histologic Grade:  Kamrar Pattern       Primary (Predominant) Pattern:  Grade 3       Secondary (Worst Remaining) Pattern:  Grade 4       Total Phoenix Score:  7       Grade Group:  Grade group 2  Extent       Tumor Quantitation       Number of Cores Positive:  3       Total Linear Millimeters of Carcinoma:  7 5 mm       Total Linear Millimeters of Needle Core Tissue:  235 mm       Total Number of Cores:  12       Proportion of Prostatic Tissue Involved by Tumor for Core with  the Greatest Amount of Tumor:  20%  Accessory Findings       Lymph-Vascular Invasion:  Not identified       Perineural Invasion:  Present  Additional Non-Tumor       Additional Pathologic Findings:  High-grade prostatic  intraepithelial neoplasia (PIN)            Clinical Trial:  No    Screening  Tobacco  Current tobacco user: yes  If yes, brief counseling provided: Yes    Hypertension  Hypertension screening performed: yes  Normotensive:  yes  If no, referred to PCP: no    Depression Screening  Screened for depression using PHQ-2: yes    Screened for depression using PHQ-9:  no  Screening positive or negative:  negative  If score >4, was any of the following actions taken?    Additional evaluation for depression, suicide risk assesment, referral to PCP or psychiatry, medication started:  no    Advanced Care Planning for Patients >65 years  Advanced Care Planning Discussed:  yes  Patient named surrogate decision maker or care plan in chart: no      Health Maintenance   Topic Date Due    Hepatitis C Screening  1945    Depression Screening PHQ  1945    Medicare Annual Wellness Visit (AWV)  1945    DM Eye Exam  09/28/1956    BMI: Followup Plan  09/28/1964    HEPATITIS B VACCINES (1 of 3 - Risk 3-dose series) 09/28/1965    Fall Risk  09/28/2011    URINE MICROALBUMIN  01/29/2015    Diabetic Foot Exam  04/12/2017    HEMOGLOBIN A1C  09/11/2019    BMI: Adult  03/25/2020    CRC Screening: Colonoscopy  10/22/2023    DTaP,Tdap,and Td Vaccines (2 - Td) 07/05/2026    INFLUENZA VACCINE  Completed    Pneumococcal PPSV23/PCV13 65+ Years / High and Highest Risk  Completed       Patient Active Problem List   Diagnosis    Malignant [facility] prostate ([facility] )    Diabetes ([facility] )    Heart disease     Past Medical History:   Diagnosis Date    Acid reflux     Diabetes ([facility] )     Fungus infection     Rectal fungus    Heart disease     High cholesterol     Hypertension     Hypothyroidism     Prostate cancer ([facility] )      Past Surgical History:   Procedure Laterality Date    BREAST CYST EXCISION      CARDIAC SURGERY      STENT    COLONOSCOPY       Family History   Problem Relation Age of Onset    No Known Problems Father     No Known Problems Mother     Prostate cancer Brother      Social History     Socioeconomic History    Marital status:      Spouse name: Not on file    Number of children: Not on file    Years of education: Not on file    Highest education level: Not on file   Occupational History    Not on file   Social Needs    Financial resource strain: Not on file    Food insecurity:     Worry: Not on file     Inability: Not on file    Transportation needs:     Medical: Not on file     Non-medical: Not on file   Tobacco Use    Smoking status: Current Every Day Smoker     Packs/day: 0 50     Types: Cigarettes    Smokeless tobacco: Never Used   Substance and Sexual Activity    Alcohol use: Yes     Comment: Occasional wine    Drug use: No    Sexual activity: Not on file   Lifestyle    Physical activity:     Days per week: Not on file     Minutes per session: Not on file    Stress: Not on file   Relationships    Social connections:     Talks on phone: Not on file     Gets together: Not on file     Attends Muslim service: Not on file     Active member of club or organization: Not on file     Attends meetings of clubs or organizations: Not on file     Relationship status: Not on file    Intimate partner violence:     Fear of current or ex partner: Not on file     Emotionally abused: Not on file     Physically abused: Not on file     Forced sexual activity: Not on file   Other Topics Concern    Not on file   Social History Narrative    Not on file       Current Outpatient Medications:     aspirin 81 MG tablet, Take 81 mg by mouth, Disp: , Rfl:     clopidogrel (PLAVIX) 75 mg tablet, TAKE 1 TABLET BY MOUTH EVERY DAY, Disp: , Rfl:     Dapagliflozin Propanediol 10 MG TABS, Take 10 mg by mouth, Disp: , Rfl:     Ergocalciferol (VITAMIN D2) 2000 units TABS, Take 50,000 Units by mouth, Disp: , Rfl:     glucagon (GLUCAGON EMERGENCY) 1 MG injection, Inject as directed as needed  , Disp: , Rfl:     insulin aspart (NovoLOG) 100 units/mL injection, Inject 100 Units/mL under the skin Pump , Disp: , Rfl:     ketoconazole (NIZORAL) 2 % cream, Apply topically, Disp: , Rfl:     levothyroxine 50 mcg tablet, 1 tablet daily- 2 pills Saturday and sunday, Disp: , Rfl:     losartan (COZAAR) 25 mg tablet, Take 25 mg by mouth 2 (two) times a day, Disp: , Rfl: 1    metoprolol tartrate (LOPRESSOR) 25 mg tablet, Take 25 mg by mouth, Disp: , Rfl:     rosuvastatin (CRESTOR) 40 MG tablet, Take by mouth, Disp: , Rfl:     clotrimazole-betamethasone (LOTRISONE) 1-0 05 % cream, Apply topically, Disp: , Rfl:     omeprazole (PRILOSEC) 20 mg delayed release capsule, Reported on 2/14/2017 , Disp: , Rfl:     No Known Allergies    Review of Systems:  Review of Systems   Constitutional: Negative  HENT: Negative  Eyes: Negative  Gastrointestinal: Negative  Endocrine: Negative  Genitourinary: Positive for frequency (nocturia x 5) and urgency  Musculoskeletal: Negative  Skin: Negative  Allergic/Immunologic: Negative  Neurological: Positive for numbness (numb and tingling in hands and feet)  Hematological: Negative  Psychiatric/Behavioral: Negative          Vitals:    03/27/19 1243   BP: 116/72   BP Location: Right arm   Pulse: 74   Resp: 18   Temp: (!) 97 4 °F (36 3 °C)   TempSrc: Temporal   SpO2: 98%   Weight: 82 3 kg (181 lb 6 4 oz) Imaging:No results found      Teaching: RT education packet provided and discussed prostate radiation

## 2019-03-28 NOTE — PROGRESS NOTES
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  He was actually diagnosed over 1 year ago and has been receiving various opinions in the interim regarding potential treatment  He has met with Urology at [facility] eventually transferring to Dr Ervin Blair and most recently Dr Santos Phillips   He has also met with Radiation Oncology at [facility]  He presents today to again discuss radiation therapy options  68year old male initially followed by [facility], Dr Tip Messer with a rising PSA at 4 9 in 2017  He underwent prostate biopsy at [facility] on 18 revealed Camelia 7 adenocarcinoma of the prostate with 3 of 12 cores  His Prolaris score 3 8 indicating intermediate risk  AUA risk profile also intermediate  He had additional single core of questionable significance as well  3/27/18 Consult with Dr Maegan Swanson, Radiation Oncology at [facility] - discussed radiation therapy  Pt undecided if he wanted to proceed with prostatectomy or radiation  Pt did not follow-up with [facility] to proceed with any treatments  10/2/18 Consult with Urology, Dr Ervin Blair at [address] referred for 2nd opinion  Do not recommend active surveillance  Radiation therapy options were again reviewed including ADT  18 Dr Ervin Blair follow-up, patient has not yet made a decision on treatment  Reviewed options again including active surveillance, RT, Surgical intervention  Pt will f/u in 4 months with repeat PSA       3/11/19 PSA 5 83  2/2/19 5 44  12/1/17 4 90  5/29/15 3 19    3/25/19 Dr Brenda Boston, Urology  Discussed treatment options including observation, radiation therapy, and surgical management  He also asked about cryotherapy, but he now realizes is not an option for primary treatment but may be for salvage if necessary  Pt still unsure on treatment  He would like to consider radiation at [facility]  He understands this would be external beam RT and not alternate forms of radiation  4/29/19 Dr Brenda Boston follow-up        Historical Information      Malignant neoplasm of prostate [facility])    1/2018 Initial Diagnosis     Malignant neoplasm of prostate ([facility] [address] )         1/16/2018 Biopsy     HNL (Dr Juanjo Gaona, [facility] urology)    Consistent with Average AUA Intermediate Risk  Prolaris Score:  3 8     A  Right base lateral prostate biopsy:  Negative for malignancy  High grade PIN present  B  Right base medial prostate biopsy:  Negative for malignancy  High grade PIN present  C  Right mid lateral prostate biopsy:  Negative for malignancy  D  Right midmedial prostate biopsy:  Negative for malignancy  E  Right [place] lateral prostate biopsy:  Negative for malignancy  F  Right [place] medial prostate biopsy:  Negative for malignancy  G  Left base lateral prostate biopsy:  Negative for malignancy  H  Left base medial prostate biopsy:  Adenocarcinoma, Furlong 7 (3+4)    Linear tumor dimension is 4 5 mm  Tumor involves approximately 20% of biopsy core   Perineural tumor  invasion identified  I  Left mid lateral prostate biopsy:  Adenocarcinoma, Furlong 6 (3+3)    Linear tumor dimension-0 8 mm  Tumor involves approximately 4% of biopsy core  Immunohistochemical staining with PCA supports this diagnosis       J  Left midmedial prostate biopsy:  Adenocarcinoma, Furlong 6 (3+3)    Linear tumor dimension-2 2 mm     Tumor involves approximately 9% of biopsy core   Immunohistochemical staining with PCA supports this diagnosis         K  Left apex lateral prostate biopsy:  Single atypical gland present   Cannot rule out adenocarcinoma versus  branching PIN    Gland not represented for evaluation on  immunohistochemical staining with PCA       L  Left apex medial prostate biopsy:  Negative for malignancy        Synoptic Report:  Primary Specimen:   H: PROSTATE NEEDLE BIOPSY, LEFT BASE MEDIAL  Synoptic Report Includes:  Specimens A through L  Tumor       Histologic Type:  Adenocarcinoma (acinar, not otherwise  specified)       Histologic Grade:  Bee Pattern       Primary (Predominant) Pattern:  Grade 3       Secondary (Worst Remaining) Pattern:  Grade 4       Total Bee Score:  7       Grade Group:  Grade group 2  Extent       Tumor Quantitation       Number of Cores Positive:  3       Total Linear Millimeters of Carcinoma:  7 5 mm       Total Linear Millimeters of Needle Core Tissue:  235 mm       Total Number of Cores:  12       Proportion of Prostatic Tissue Involved by Tumor for Core with  the Greatest Amount of Tumor:  20%  Accessory Findings       Lymph-Vascular Invasion:  Not identified       Perineural Invasion:  Present  Additional Non-Tumor       Additional Pathologic Findings:  High-grade prostatic  intraepithelial neoplasia (PIN)              Past Medical History:   Diagnosis Date    Acid reflux     Diabetes (HCC)     Fungus infection     Rectal fungus    Heart disease     High cholesterol     Hypertension     Hypothyroidism     Prostate cancer ([facility] [address] )      Past Surgical History:   Procedure Laterality Date    BREAST CYST EXCISION      CARDIAC SURGERY      STENT    COLONOSCOPY         Family History   Problem Relation Age of Onset    No Known Problems Father     No Known Problems Mother     Prostate cancer Brother        Social History   Social History     Substance and Sexual Activity   Alcohol Use Yes    Comment: Occasional wine     Social History     Substance and Sexual Activity   Drug Use No     Social History     Tobacco Use   Smoking Status Current Every Day Smoker    Packs/day: 0 50    Types: Cigarettes   Smokeless Tobacco Never Used         Meds/Allergies     Current Outpatient Medications:     aspirin 81 MG tablet, Take 81 mg by mouth, Disp: , Rfl:     clopidogrel (PLAVIX) 75 mg tablet, TAKE 1 TABLET BY MOUTH EVERY DAY, Disp: , Rfl:     Dapagliflozin Propanediol 10 MG TABS, Take 10 mg by mouth, Disp: , Rfl:     Ergocalciferol (VITAMIN D2) 2000 units TABS, Take 50,000 Units by mouth, Disp: , Rfl:     glucagon (GLUCAGON EMERGENCY) 1 MG injection, Inject as directed as needed  , Disp: , Rfl:     insulin aspart (NovoLOG) 100 units/mL injection, Inject 100 Units/mL under the skin Pump , Disp: , Rfl:     ketoconazole (NIZORAL) 2 % cream, Apply topically, Disp: , Rfl:     levothyroxine 50 mcg tablet, 1 tablet daily- 2 pills Saturday and sunday, Disp: , Rfl:     losartan (COZAAR) 25 mg tablet, Take 25 mg by mouth 2 (two) times a day, Disp: , Rfl: 1    metoprolol tartrate (LOPRESSOR) 25 mg tablet, Take 25 mg by mouth, Disp: , Rfl:     rosuvastatin (CRESTOR) 40 MG tablet, Take by mouth, Disp: , Rfl:     clotrimazole-betamethasone (LOTRISONE) 1-0 05 % cream, Apply topically, Disp: , Rfl:     omeprazole (PRILOSEC) 20 mg delayed release capsule, Reported on 2/14/2017 , Disp: , Rfl:   No Known Allergies      Review of Systems   Review of Systems   Constitutional: Negative  HENT: Negative  Eyes: Negative  Gastrointestinal: Negative  Endocrine: Negative  Genitourinary: Positive for frequency (nocturia x 5) and urgency  Musculoskeletal: Negative  Skin: Negative  Allergic/Immunologic: Negative  Neurological: Positive for numbness (numb and tingling in hands and feet)  Hematological: Negative  Psychiatric/Behavioral: Negative        Screening  Tobacco  Current tobacco user: yes  If yes, brief counseling provided: Yes    Hypertension  Hypertension screening performed: yes  Normotensive:  yes  If no, referred to PCP: no    Depression Screening  Screened for depression using PHQ-2: yes    Screened for depression using PHQ-9:  no  Screening positive or negative:  negative  If score >4, was any of the following actions taken? Additional evaluation for depression, suicide risk assesment, referral to PCP or psychiatry, medication started:  no    Advanced Care Planning for Patients >65 years  Advanced Care Planning Discussed:  yes  Patient named surrogate decision maker or care plan in chart: no        OBJECTIVE:   /72 (BP Location: Right arm)   Pulse 74   Temp (!) 97 4 °F (36 3 °C) (Temporal)   Resp 18   Wt 82 3 kg (181 lb 6 4 oz)   SpO2 98%   BMI 26 79 kg/m²   Pain Assessment:  0  Performance Status: Karnofsky: 90 - Able to carry on normal activity; minor signs or symptoms of disease     Physical Exam   The patient presents today no apparent distress  Sclera anicteric  Normal respiratory effort  Normal speech  Normal affect  Prostate exam deferred  RESULTS  Lab Results    Chemistry    No results found for: NA, K, CL, CO2, BUN, CREATININE No results found for: CALCIUM, ALKPHOS, AST, ALT, BILITOT         No results found for: WBC, HGB, HCT, MCV, PLT      Imaging Studies  No results found  ASSESSMENT  1  Malignant neoplasm of prostate ([facility] [address] )       Cancer Staging  Malignant neoplasm of prostate ([facility] [address] )  Staging form: Prostate, AJCC 8th Edition  - Clinical: Stage IIB (cT1c, cN0, cM0, PSA: 5 8, Grade Group: 2) - Signed by Jimena Simms MD on 3/28/2019        PLAN/DISCUSSION  No orders of the defined types were placed in this encounter  Maribeth Vázquez is a 68y o  year old male with recently diagnosed Stage IIB (cT1c, cN0, cM0, PSA: 5 8, Grade Group: 2) intermediate 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  He was diagnosed over 1 year ago and has been obtaining various opinions in the interim    We believe the patient would be best served with definitive radiation therapy with or without a short course of androgen deprivation  He has already met with Radiation Oncology at [facility]  Options were again discussed including standard fractionation intensity modulated radiation delivered over the course of approximately 9 weeks, Hydrogel spacer placement with hypo fractionated treatment delivered over approximately 6 weeks, stereotactic radio surgery, and IMRT with brachytherapy boost   Given that he has a single core of Camelia 3 + 4 disease, with extensive cardiac and diabetes history, the benefit of a short course of neoadjuvant and concurrent androgen deprivation is questionable but was discussed  The associated risks and toxicities of external beam radiation therapy were again discussed  The patient seems to be leaning toward radiation rather than robotic prostatectomy, but remains unsure where he would like to receive treatment  He will continue to consider his options and will contact our department should he desire treatment with [address] [place] [address] Oncology  He will continue to follow with his urologist's in the interim  Jimena Simms MD  3/28/2019,10:10 AM      Portions of the record may have been created with voice recognition software   Occasional wrong word or "sound a like" substitutions may have occurred due to the inherent limitations of voice recognition software   Read the chart carefully and recognize, using context, where substitutions have occurred

## 2019-09-20 ENCOUNTER — TELEPHONE (OUTPATIENT)
Dept: UROLOGY | Facility: AMBULATORY SURGERY CENTER | Age: 74
End: 2019-09-20

## 2019-09-20 NOTE — TELEPHONE ENCOUNTER
Marcela from [employer] called to speak to Nurse Janae Portillo as she spoke to her earlier today and was told to put a referral into the system, Chip Hutchison says she can not put one in as they are affiliated with [facility]  She says patient needs an appt for Gold Seed Fiducial Marker & hormone injections per Dr Sheri Mcdermott, established pt of Dr Callie Hutchison can be reached at [phone]

## 2019-09-23 NOTE — TELEPHONE ENCOUNTER
Based on current guidelines, he does not require Lupron or other androgen deprivation  If he wants Lupron, he does not need any premedication

## 2019-09-23 NOTE — TELEPHONE ENCOUNTER
Tamara Almanzar returned my call and stated that they had just seen the patient in consultation and is waiting for us to give him a Lupron injection and to be scheduled for marker insertion  Would you like patient to start Casodex 3 weeks prior this visit?

## 2019-09-23 NOTE — TELEPHONE ENCOUNTER
LMOM for Marcela to return call  When I had spoken with Phillip Elena on Friday,   I had asked her if patient was started on Casodex  She said he was not and didn't even know what Casodex was     Please let me know what time frame for the fiducial markers and Lupron injection and if patient should start on Casodex prior and we will schedule

## 2019-10-02 NOTE — TELEPHONE ENCOUNTER
Azra Membreno returned the call from [facility] radiation oncology  She reports the patient gave her Dr Yoli Hubbard as his urologist  She reports that Dr Karin Matt has recommended both lupron and gold seed marker placement  I reviewed that Dr oYli Hubbard reported that lupron was not recommended based on guideline but per Azra Matt still recommended it  The patient is not scheduled for radiation to start until this is done  She also reports that they work with [employer] but reports that Dr Karin Matt recommended regular IMRT, she likely thinks the patient either was not a candidate for space Oar or did not choose it  I explained to her we only saw the patient once for a second opinion and cancelled his further FU with our practice  I let her know that I am waiting on a call back from the patient before scheduling to ensure he wishes to FU with our practice  She verbalized understanding

## 2019-10-02 NOTE — TELEPHONE ENCOUNTER
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  He was seen in March for a second opinion for his corinne 7 prostate cancer  At that time he was to follow up with [facility] radiation oncology department  He saw Dr Kate Umana with rad onc in March as well  At that visit he was still undecided on how he wanted to proceed with treatment and was to call once he decided  Patient cancelled his FU with Dr Yoli Hubbard in April of 2019  It is unclear what treatment options patient has decided to proceed with or whom he has decided to FU with  I called and left a message for Azra Membreno with [facility] radiation oncology this morning to get more information regarding this patient and case  He is currently not scheduled for anything  I also called and left a message for patient to discuss with him where and how he wanted to proceed with treatment of his prostate cancer

## 2020-03-16 ENCOUNTER — TELEPHONE (OUTPATIENT)
Dept: UROLOGY | Facility: MEDICAL CENTER | Age: 75
End: 2020-03-16

## 2020-03-16 NOTE — TELEPHONE ENCOUNTER
Patient of Dr Bradley Cerda seen in the [place] office  Patient called in regard to having robotic surgery performed by Dr Bradley Cerda   Please call patient at [phone] to discuss  Thank you

## 2020-03-16 NOTE — TELEPHONE ENCOUNTER
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  He was seen in March 2019 for a second opinion for his corinne 7 prostate cancer  At that time he was to follow up with [facility] radiation oncology department  He saw Dr Vasquez Izaguirre with rad onc in March as well  At that visit he was still undecided on how he wanted to proceed with treatment and was to call once he decided  Patient canceled f/u with Dr Magaly Frias April 2019  Patient then saw Zully Guillen  Nurse from our office spoke with them October 2019  Dr Benson Holland recommended regular IMRT, she likely thinks the patient either was not a candidate for space Oar or did not choose it  Our office reached out to patient to discuss and patient stated that he was "exploring his surgical options before scheduling radiation"  Call returned to patient, advised that we are not scheduling non- urgent patient in the next few weeks  He states "well its not like this is an emergency, I've waited a year at this point"  Patient scheduled to be seen by Dr Magaly Frias in May  Patient verbalized understanding and agrees with plan

## 2020-05-11 NOTE — TELEPHONE ENCOUNTER
Called both numbers provided  When I call home phone someone picks up and then hangs up on me  Other number provided will not dial out   Please have pt confirm that his appointment will now be virtual

## 2020-05-13 ENCOUNTER — TELEPHONE (OUTPATIENT)
Dept: UROLOGY | Facility: AMBULATORY SURGERY CENTER | Age: 75
End: 2020-05-13

## 2020-05-18 ENCOUNTER — TELEMEDICINE (OUTPATIENT)
Dept: UROLOGY | Facility: AMBULATORY SURGERY CENTER | Age: 75
End: 2020-05-18
Payer: MEDICARE

## 2020-05-18 ENCOUNTER — TELEPHONE (OUTPATIENT)
Dept: UROLOGY | Facility: AMBULATORY SURGERY CENTER | Age: 75
End: 2020-05-18

## 2020-05-18 DIAGNOSIS — C61 MALIGNANT NEOPLASM OF PROSTATE (HCC): Primary | ICD-10-CM

## 2020-05-18 PROCEDURE — 99442 PR PHYS/QHP TELEPHONE EVALUATION 11-20 MIN: CPT | Performed by: UROLOGY

## 2020-05-29 ENCOUNTER — APPOINTMENT (OUTPATIENT)
Dept: LAB | Age: 75
End: 2020-05-29
Payer: MEDICARE

## 2020-05-29 ENCOUNTER — HOSPITAL ENCOUNTER (OUTPATIENT)
Dept: RADIOLOGY | Age: 75
Discharge: HOME | End: 2020-05-29
Payer: MEDICARE

## 2020-05-29 DIAGNOSIS — C61 MALIGNANT NEOPLASM OF PROSTATE (HCC): ICD-10-CM

## 2020-05-29 LAB — PSA SERPL-MCNC: 6.2 NG/ML (ref 0–4)

## 2020-05-29 PROCEDURE — 72197 MRI PELVIS W/O & W/DYE: CPT

## 2020-05-29 PROCEDURE — A9585 GADOBUTROL INJECTION: HCPCS | Performed by: UROLOGY

## 2020-05-29 PROCEDURE — 84153 ASSAY OF PSA TOTAL: CPT

## 2020-05-29 PROCEDURE — 76377 3D RENDER W/INTRP POSTPROCES: CPT

## 2020-05-29 RX ADMIN — GADOBUTROL 8 ML: 604.72 INJECTION INTRAVENOUS at 08:54

## 2020-06-04 ENCOUNTER — TELEPHONE (OUTPATIENT)
Dept: UROLOGY | Facility: AMBULATORY SURGERY CENTER | Age: 75
End: 2020-06-04

## 2020-06-09 ENCOUNTER — OFFICE VISIT (OUTPATIENT)
Dept: UROLOGY | Facility: AMBULATORY SURGERY CENTER | Age: 75
End: 2020-06-09
Payer: MEDICARE

## 2020-06-09 VITALS
HEIGHT: 68 IN | SYSTOLIC BLOOD PRESSURE: 118 MMHG | WEIGHT: 178 LBS | DIASTOLIC BLOOD PRESSURE: 64 MMHG | BODY MASS INDEX: 26.98 KG/M2 | TEMPERATURE: 98.6 F | HEART RATE: 83 BPM

## 2020-06-09 DIAGNOSIS — C61 PROSTATE CANCER (HCC): Primary | ICD-10-CM

## 2020-06-09 PROCEDURE — 99214 OFFICE O/P EST MOD 30 MIN: CPT | Performed by: UROLOGY

## 2020-06-09 RX ORDER — CEFAZOLIN SODIUM 1 G/50ML
1000 SOLUTION INTRAVENOUS ONCE
Status: CANCELLED | OUTPATIENT
Start: 2020-06-09 | End: 2020-06-09

## 2020-06-11 ENCOUNTER — TELEPHONE (OUTPATIENT)
Dept: UROLOGY | Facility: AMBULATORY SURGERY CENTER | Age: 75
End: 2020-06-11

## 2020-06-29 ENCOUNTER — TELEPHONE (OUTPATIENT)
Dept: UROLOGY | Facility: AMBULATORY SURGERY CENTER | Age: 75
End: 2020-06-29

## 2020-07-17 ENCOUNTER — TELEPHONE (OUTPATIENT)
Dept: UROLOGY | Facility: AMBULATORY SURGERY CENTER | Age: 75
End: 2020-07-17

## 2020-07-17 NOTE — TELEPHONE ENCOUNTER
LMOM to ask Patient to confirm not having surgery  We had received messages from PAT and Nurse calling to review medicines but he had not called the office to himself tell us he was not wanting surgery at this time  He had no cancelled OV 7/20 either  He has called office now as per note from Ayush below to state he is not wanting surgery now due to health issues that were not given

## 2020-07-17 NOTE — TELEPHONE ENCOUNTER
Patient called me back and wants to [facility] [address] with Dr Mandeep Sparks 7/20  Please DO NOT CANCEL OV  Thank you!

## 2020-07-17 NOTE — TELEPHONE ENCOUNTER
Called pt to confirm his appointment  Pt seemed confused as to why he needed it  He stated someone from our office called him but didn't leave a message so he called us back  Pt stated he does NOT WANT surgery due to other health issues right now  He wants to cancel it and wants to talk to someone ASAP  He said "he doesn't want to sit around all day to wait for a phone call " I stated I would reach out to McMullen for more direction

## 2020-07-20 ENCOUNTER — OFFICE VISIT (OUTPATIENT)
Dept: UROLOGY | Facility: AMBULATORY SURGERY CENTER | Age: 75
End: 2020-07-20
Payer: MEDICARE

## 2020-07-20 VITALS
SYSTOLIC BLOOD PRESSURE: 120 MMHG | HEIGHT: 68 IN | DIASTOLIC BLOOD PRESSURE: 52 MMHG | TEMPERATURE: 97.1 F | WEIGHT: 178 LBS | HEART RATE: 82 BPM | BODY MASS INDEX: 26.98 KG/M2

## 2020-07-20 DIAGNOSIS — C61 MALIGNANT NEOPLASM OF PROSTATE (HCC): Primary | ICD-10-CM

## 2020-07-20 PROCEDURE — 99215 OFFICE O/P EST HI 40 MIN: CPT | Performed by: UROLOGY

## 2020-07-20 NOTE — PROGRESS NOTES
7/20/2020    Hector Ramsay  1945  [id number]        Assessment  Camelia 7 prostate cancer    Plan  Once again had an extended discussion with the patient regarding his prostate cancer  He has been very noncompliant with his diagnostics as well as his follow-up and treatment  He has been doctor shopping for the last 2 years as a means of procrastinating  We discussed that his anxiety is understandable, however he needs to decide either way whether he wishes to pursue treatment or not, and if so, what type of treatment  It has been 2 years at this point  In fact, his prostate biopsies no longer current and a recent MRI does reveal Pi-Rads 4 finding  Once again we discussed the technique, risks, benefits associated with radical robotic prostatectomy  We also discussed radiation therapy  We discussed incontinence and impotence the which may developed  All this has been previously discussed as well but I was happy to review with him today  He still unable to definitively say that he wants surgery, so we will cancel his surgery scheduled for next week  I did explain that he received cardiac clearance for this but he is still concerned about his outcome  I explained that he has borderline anyway and is reasonable for him to proceed with radiation therapy  He is not sure that he wants to do that  We discussed restaging and he should have MRI fusion biopsy as he has not had tissue diagnosis since 2018, and frankly we do not have tissue at [facility]  He agrees to proceed with MRI fusion biopsy  After this, surgery will not be an option for him but he may wish to pursue radiation therapy or potentially combined androgen deprivation and radiation  Depending on results, he may also require staging studies to be performed  He is comfortable with this plan  Total visit time was 40 minutes of which over 50% was spent on counseling      History of Present Illness  Margaux Gordon is a 69 y o  male initially diagnosed with Camelia 7 prostate cancer 3/12 cores by Dr Mickie Moss in [address]  Since that time he has been shopping for different doctors in treatment and gone back and forth without being able to make his decision  Each time he made a decision on treatment, he would subsequently become anxious and changes mine  Most recently, he decided that he wished to proceed with robotic prostatectomy  I expressed reservation due to his overall health and poorly controlled diabetes  He did however received cardiac clearance and reports that his sugars have recently been around 140 consistently for the last 2 months  We had previously med and discussed all the risks and benefits of surgery and he decided to proceed  Currently he is having misgivings once again asked to cancel  Review of Systems  Review of Systems   Constitutional: Negative  HENT: Negative  Respiratory: Negative  Cardiovascular: Negative  Gastrointestinal: Negative  Genitourinary:        As per HPI   Musculoskeletal: Negative  Skin: Negative  Neurological: Negative  Hematological: Negative            Past Medical History  Past Medical History:   Diagnosis Date    Acid reflux     Diabetes ([facility] [address] )     Fungus infection     Rectal fungus    Heart disease     High cholesterol     Hypertension     Hypothyroidism     Prostate cancer [facility])        Past Social History  Past Surgical History:   Procedure Laterality Date    BREAST CYST EXCISION      CARDIAC SURGERY      STENT    COLONOSCOPY         Past Family History  Family History   Problem Relation Age of Onset    No Known Problems Father     No Known Problems Mother     Prostate cancer Brother        Past Social history  Social History     Socioeconomic History    Marital status:      Spouse name: Not on file    Number of children: Not on file    Years of education: Not on file    Highest education level: Not on file   Occupational History    Not on file Social Needs    Financial resource strain: Not on file    Food insecurity:     Worry: Not on file     Inability: Not on file    Transportation needs:     Medical: Not on file     Non-medical: Not on file   Tobacco Use    Smoking status: Current Every Day Smoker     Packs/day: 0 50     Types: Cigarettes    Smokeless tobacco: Never Used   Substance and Sexual Activity    Alcohol use: Yes     Comment: Occasional wine    Drug use: No    Sexual activity: Not on file   Lifestyle    Physical activity:     Days per week: Not on file     Minutes per session: Not on file    Stress: Not on file   Relationships    Social connections:     Talks on phone: Not on file     Gets together: Not on file     Attends Druze service: Not on file     Active member of club or organization: Not on file     Attends meetings of clubs or organizations: Not on file     Relationship status: Not on file    Intimate partner violence:     Fear of current or ex partner: Not on file     Emotionally abused: Not on file     Physically abused: Not on file     Forced sexual activity: Not on file   Other Topics Concern    Not on file   Social History Narrative    Not on file     Social History     Tobacco Use   Smoking Status Current Every Day Smoker    Packs/day: 0 50    Types: Cigarettes   Smokeless Tobacco Never Used       Current Medications  Current Outpatient Medications   Medication Sig Dispense Refill    aspirin 81 MG tablet Take 81 mg by mouth      clopidogrel (PLAVIX) 75 mg tablet TAKE 1 TABLET BY MOUTH EVERY DAY      Dapagliflozin Propanediol 10 MG TABS Take 10 mg by mouth      Ergocalciferol (VITAMIN D2) 2000 units TABS Take 50,000 Units by mouth      glucagon (GLUCAGON EMERGENCY) 1 MG injection Inject as directed as needed        metoprolol tartrate (LOPRESSOR) 25 mg tablet Take 25 mg by mouth      omeprazole (PRILOSEC) 20 mg delayed release capsule Reported on 2/14/2017       rosuvastatin (CRESTOR) 40 MG tablet Take by mouth      clotrimazole-betamethasone (LOTRISONE) 1-0 05 % cream Apply topically      insulin aspart (NovoLOG) 100 units/mL injection Inject 100 Units/mL under the skin Pump       ketoconazole (NIZORAL) 2 % cream Apply topically      levothyroxine 50 mcg tablet 1 tablet daily- 2 pills Saturday and sunday      losartan (COZAAR) 25 mg tablet Take 25 mg by mouth 2 (two) times a day  1     No current facility-administered medications for this visit  Allergies  No Known Allergies    Past Medical History, Social History, Family History, medications and allergies were reviewed  Vitals  Vitals:    07/20/20 1108   BP: 120/52   Pulse: 82   Temp: (!) 97 1 °F (36 2 °C)   Weight: 80 7 kg (178 lb)   Height: 5' 8" (1 727 m)       Physical Exam  Physical Exam   Constitutional: He is oriented to person, place, and time  He appears well-developed and well-nourished  HENT:   Head: Normocephalic and atraumatic  Eyes: Conjunctivae are normal    Cardiovascular: Normal rate  Pulmonary/Chest: Effort normal    Abdominal: Soft  Musculoskeletal: Normal range of motion  Neurological: He is alert and oriented to person, place, and time  Skin: Skin is warm, dry and intact  Psychiatric: He has a normal mood and affect  Vitals reviewed          Results  Lab Results   Component Value Date    PSA 6 2 (H) 05/29/2020     No results found for: GLUCOSE, CALCIUM, NA, K, CO2, CL, BUN, CREATININE  No results found for: WBC, HGB, HCT, MCV, PLT

## 2020-07-23 ENCOUNTER — TELEPHONE (OUTPATIENT)
Dept: UROLOGY | Facility: MEDICAL CENTER | Age: 75
End: 2020-07-23

## 2020-07-23 NOTE — TELEPHONE ENCOUNTER
----- Message from Maryan Feliciano sent at 7/20/2020 11:59 AM EDT -----  Regarding: MRI FUSION BIOPSY  Steven Dc,    This is a pt of Dr Nela Falcon, seen today   Thank you

## 2020-08-05 DIAGNOSIS — R97.20 ELEVATED PSA: Primary | ICD-10-CM

## 2020-08-05 DIAGNOSIS — F41.9 ANXIETY: ICD-10-CM

## 2020-08-05 RX ORDER — CIPROFLOXACIN 500 MG/1
500 TABLET, FILM COATED ORAL EVERY 12 HOURS SCHEDULED
Qty: 2 TABLET | Refills: 0 | Status: SHIPPED | OUTPATIENT
Start: 2020-08-05 | End: 2020-08-06

## 2020-08-05 RX ORDER — LORAZEPAM 2 MG/1
2 TABLET ORAL EVERY 8 HOURS PRN
Qty: 1 TABLET | Refills: 0 | Status: SHIPPED | OUTPATIENT
Start: 2020-08-05

## 2020-08-11 NOTE — TELEPHONE ENCOUNTER
Patient called back and he is returning phone call to Cherrie Holbrook  Please call him at [phone]

## 2020-08-12 NOTE — TELEPHONE ENCOUNTER
Spoke to patient MRI Fusion has been rescheduled to the GI lab 9/8/2020  Will resend instructions to patient

## 2020-08-19 NOTE — TELEPHONE ENCOUNTER
Patient aware 9/8 MRI Fusion Bx is canceled   Once I get the go ahead to schedule at the GI lab I will call the patient and schedule for the end of September per patients request

## 2020-08-24 LAB — HBA1C MFR BLD HPLC: 8.2 %

## 2020-08-25 ENCOUNTER — TELEPHONE (OUTPATIENT)
Dept: UROLOGY | Facility: MEDICAL CENTER | Age: 75
End: 2020-08-25

## 2020-08-25 NOTE — TELEPHONE ENCOUNTER
ASAEL  Received call from PollitoIngles for Dr Fe Boyle are now in [place]  Please be aware    Thank you

## 2020-09-22 NOTE — TELEPHONE ENCOUNTER
Patient scheduled for MRI Fusion Biopsy 11/10/2020 at [address] [place] [address]   Instructions and directions mailed to patient

## 2020-09-22 NOTE — TELEPHONE ENCOUNTER
Patient called back and is returning Vanda's phone call  He said he is fine with 11/10/20  He said if you need to speak with him then please call him at [phone]

## 2020-11-10 ENCOUNTER — PROCEDURE VISIT (OUTPATIENT)
Dept: UROLOGY | Facility: AMBULATORY SURGERY CENTER | Age: 75
End: 2020-11-10
Payer: MEDICARE

## 2020-11-10 ENCOUNTER — TELEPHONE (OUTPATIENT)
Dept: UROLOGY | Facility: MEDICAL CENTER | Age: 75
End: 2020-11-10

## 2020-11-10 VITALS
HEIGHT: 68 IN | SYSTOLIC BLOOD PRESSURE: 122 MMHG | HEART RATE: 80 BPM | DIASTOLIC BLOOD PRESSURE: 60 MMHG | BODY MASS INDEX: 26.64 KG/M2 | WEIGHT: 175.8 LBS | TEMPERATURE: 97.1 F

## 2020-11-10 DIAGNOSIS — C61 PROSTATE CANCER (HCC): Primary | ICD-10-CM

## 2020-11-10 DIAGNOSIS — N39.0 URINARY TRACT INFECTION WITHOUT HEMATURIA, SITE UNSPECIFIED: Primary | ICD-10-CM

## 2020-11-10 DIAGNOSIS — R35.0 URINARY FREQUENCY: ICD-10-CM

## 2020-11-10 LAB — POST-VOID RESIDUAL VOLUME, ML POC: 42 ML

## 2020-11-10 PROCEDURE — 51798 US URINE CAPACITY MEASURE: CPT

## 2020-11-11 ENCOUNTER — PATIENT OUTREACH (OUTPATIENT)
Dept: UROLOGY | Facility: AMBULATORY SURGERY CENTER | Age: 75
End: 2020-11-11

## 2020-11-11 DIAGNOSIS — C61 MALIGNANT NEOPLASM OF PROSTATE (HCC): Primary | ICD-10-CM

## 2020-11-13 ENCOUNTER — TELEPHONE (OUTPATIENT)
Dept: UROLOGY | Facility: MEDICAL CENTER | Age: 75
End: 2020-11-13

## 2020-11-19 RX ORDER — CIPROFLOXACIN 500 MG/1
500 TABLET, FILM COATED ORAL EVERY 12 HOURS SCHEDULED
Qty: 10 TABLET | Refills: 0 | Status: SHIPPED | OUTPATIENT
Start: 2020-11-19 | End: 2020-11-24

## 2020-12-14 ENCOUNTER — PATIENT OUTREACH (OUTPATIENT)
Dept: CASE MANAGEMENT | Facility: HOSPITAL | Age: 75
End: 2020-12-14

## 2021-09-27 ENCOUNTER — OFFICE VISIT (OUTPATIENT)
Dept: UROLOGY | Facility: AMBULATORY SURGERY CENTER | Age: 76
End: 2021-09-27
Payer: MEDICARE

## 2021-09-27 VITALS
HEIGHT: 68 IN | DIASTOLIC BLOOD PRESSURE: 82 MMHG | HEART RATE: 80 BPM | SYSTOLIC BLOOD PRESSURE: 120 MMHG | WEIGHT: 163 LBS | BODY MASS INDEX: 24.71 KG/M2

## 2021-09-27 DIAGNOSIS — C61 PROSTATE CANCER (HCC): Primary | ICD-10-CM

## 2021-09-27 PROCEDURE — 99214 OFFICE O/P EST MOD 30 MIN: CPT | Performed by: UROLOGY

## 2021-09-27 RX ORDER — ROSUVASTATIN CALCIUM 40 MG/1
40 TABLET, COATED ORAL DAILY
COMMUNITY
Start: 2021-06-14 | End: 2022-06-14

## 2021-09-27 RX ORDER — ORAL SEMAGLUTIDE 7 MG/1
7 TABLET ORAL
COMMUNITY
Start: 2021-06-14

## 2021-09-27 RX ORDER — LOSARTAN POTASSIUM 100 MG/1
100 TABLET ORAL DAILY
COMMUNITY
Start: 2021-01-24 | End: 2022-01-24

## 2021-09-27 RX ORDER — PSEUDOEPHEDRINE HCL 30 MG
100 TABLET ORAL 2 TIMES DAILY
COMMUNITY

## 2021-09-27 RX ORDER — EVOLOCUMAB 140 MG/ML
140 INJECTION, SOLUTION SUBCUTANEOUS
COMMUNITY
Start: 2021-07-22

## 2021-09-27 RX ORDER — OMEPRAZOLE 20 MG/1
20 CAPSULE, DELAYED RELEASE ORAL
COMMUNITY
End: 2021-11-29 | Stop reason: SDUPTHER

## 2021-09-27 RX ORDER — AMOXICILLIN 250 MG
1 CAPSULE ORAL 2 TIMES DAILY
COMMUNITY
Start: 2021-01-24 | End: 2022-01-24

## 2021-09-27 NOTE — PROGRESS NOTES
9/27/2021    Albrightidalmis Ramsay  1945  [id number]        Assessment  Estelline 7 prostate cancer, non compliant untreated    Plan  We had a long and priyanka discussion regarding his history and findings  Patient understands he may be dealing with advanced prostate cancer at this point based on change in symptomatology  Unfortunately, he may not be curable  We need to re-stage him urgently with PSA as well as MRI  This will determine presence of any fistula, although by examination there does not seem to be one  He may simply be dealing with watery bowel movements as result of partial obstruction  He understands and says that he will comply at this time  He also understands he is not a candidate for surgery but may be a candidate for combined androgen deprivation and radiation therapy depending on findings  May consider a voiding another biopsy and proceeding with treatment depending on results of PSA and MRI  He will follow-up with me to discuss after the MRI  History of Present Illness  Eunice Carvajal is a 77 y o  male who has been noncompliant with recommendations for treatment  He was diagnosed Estelline 7 prostate cancer in 6190 by Dr Roselind Paget  Since that time surgery and/or radiation therapy has been recommended on numerous occasions  He canceled the surgical appointment  Follow-up MRI and biopsy were then recommended which or also not done  Last PSA was August 20 27 6  He is now complaining of symptoms  He has continuous incontinence and is wearing depends throughout the day  This is what brought him in for appointment today  He also complains of passing what seems to be urine from his rectum after he passes gas  Bladder scan PVR 0 ml  Review of Systems  Review of Systems   Constitutional: Negative  HENT: Negative  Respiratory: Negative  Cardiovascular: Negative  Gastrointestinal: Negative  Genitourinary:        As per HPI   Musculoskeletal: Negative  Skin: Negative  Neurological: Negative  Hematological: Negative  Past Medical History  Past Medical History:   Diagnosis Date    Acid reflux     Diabetes ([address] )     Fungus infection     Rectal fungus    Heart disease     High cholesterol     Hypertension     Hypothyroidism     Prostate cancer [facility])        Past Social History  Past Surgical History:   Procedure Laterality Date    BREAST CYST EXCISION      CARDIAC SURGERY      STENT    COLONOSCOPY         Past Family History  Family History   Problem Relation Age of Onset    No Known Problems Father     No Known Problems Mother     Prostate cancer Brother        Past Social history  Social History     Socioeconomic History    Marital status:      Spouse name: Not on file    Number of children: Not on file    Years of education: Not on file    Highest education level: Not on file   Occupational History    Not on file   Tobacco Use    Smoking status: Current Every Day Smoker     Packs/day: 0 50     Types: Cigarettes    Smokeless tobacco: Never Used   Substance and Sexual Activity    Alcohol use: Yes     Comment: Occasional wine    Drug use: No    Sexual activity: Not on file   Other Topics Concern    Not on file   Social History Narrative    Not on file     Social Determinants of Health     Financial Resource Strain:     Difficulty of Paying Living Expenses:    Food Insecurity:     Worried About Running Out of Food in the Last Year:     Ran Out of Food in the Last Year:    Transportation Needs:     Lack of Transportation (Medical):      Lack of Transportation (Non-Medical):    Physical Activity:     Days of Exercise per Week:     Minutes of Exercise per Session:    Stress:     Feeling of Stress :    Social Connections:     Frequency of Communication with Friends and Family:     Frequency of Social Gatherings with Friends and Family:     Attends Taoism Services:     Active Member of Clubs or Organizations:     Attends [employer] or Organization Meetings:     Marital Status:    Intimate Partner Violence:     Fear of Current or Ex-Partner:     Emotionally Abused:     Physically Abused:     Sexually Abused:      Social History     Tobacco Use   Smoking Status Current Every Day Smoker    Packs/day: 0 50    Types: Cigarettes   Smokeless Tobacco Never Used       Current Medications  Current Outpatient Medications   Medication Sig Dispense Refill    aspirin 81 MG tablet Take 81 mg by mouth      clopidogrel (PLAVIX) 75 mg tablet TAKE 1 TABLET BY MOUTH EVERY DAY      Dapagliflozin Propanediol 10 MG TABS Take 10 mg by mouth      Docusate Sodium (DSS) 100 MG CAPS Take 100 mg by mouth 2 (two) times a day      Ergocalciferol (VITAMIN D2) 2000 units TABS Take 50,000 Units by mouth      Evolocumab (Repatha SureClick) 228 MG/ML SOAJ Inject 140 mg under the skin every 14 (fourteen) days      glucagon (GLUCAGON EMERGENCY) 1 MG injection Inject as directed as needed        insulin aspart (NovoLOG) 100 units/mL injection Inject 100 Units/mL under the skin Pump       levothyroxine 50 mcg tablet 1 tablet daily- 2 pills Saturday and sunday      LORazepam (ATIVAN) 2 mg tablet Take 1 tablet (2 mg total) by mouth every 8 (eight) hours as needed for anxiety 1 tablet 0    losartan (COZAAR) 100 MG tablet Take 100 mg by mouth daily      losartan (COZAAR) 25 mg tablet Take 25 mg by mouth 2 (two) times a day  1    metoprolol tartrate (LOPRESSOR) 25 mg tablet Take 25 mg by mouth      omeprazole (PRILOSEC) 20 mg delayed release capsule Reported on 2/14/2017       omeprazole (PriLOSEC) 20 mg delayed release capsule Take 20 mg by mouth      rosuvastatin (CRESTOR) 40 MG tablet Take by mouth      rosuvastatin (CRESTOR) 40 MG tablet Take 40 mg by mouth daily      Semaglutide (Rybelsus) 7 MG TABS Take 7 mg by mouth      senna-docusate sodium (SENOKOT S) 8 6-50 mg per tablet Take 1 tablet by mouth 2 (two) times a day      clotrimazole-betamethasone (LOTRISONE) 1-0 05 % cream Apply topically      ketoconazole (NIZORAL) 2 % cream Apply topically       No current facility-administered medications for this visit  Allergies  No Known Allergies    Past Medical History, Social History, Family History, medications and allergies were reviewed  Vitals  Vitals:    09/27/21 1504   BP: 120/82   Pulse: 80   Weight: 73 9 kg (163 lb)   Height: 5' 8" (1 727 m)       Physical Exam  Physical Exam  Vitals reviewed  Constitutional:       Appearance: He is well-developed  HENT:      Head: Normocephalic and atraumatic  Eyes:      Conjunctiva/sclera: Conjunctivae normal    Cardiovascular:      Rate and Rhythm: Normal rate  Pulmonary:      Effort: Pulmonary effort is normal    Abdominal:      Palpations: Abdomen is soft  Genitourinary:     Comments: Normal penis and palpably normal urethra  Normal scrotum  No evidence of fistula  Rectal examination reveals hard prostate, about 40 g, consistent with prostate cancer  Musculoskeletal:      Comments: Using a cane   Skin:     General: Skin is warm and dry  Neurological:      Mental Status: He is alert and oriented to person, place, and time     Psychiatric:         Mood and Affect: Mood normal            Results  Lab Results   Component Value Date    PSA 6 2 (H) 05/29/2020     No results found for: GLUCOSE, CALCIUM, NA, K, CO2, CL, BUN, CREATININE  No results found for: WBC, HGB, HCT, MCV, PLT

## 2021-10-05 ENCOUNTER — TELEPHONE (OUTPATIENT)
Dept: RADIOLOGY | Facility: HOSPITAL | Age: 76
End: 2021-10-05

## 2021-10-08 ENCOUNTER — TELEPHONE (OUTPATIENT)
Dept: UROLOGY | Facility: MEDICAL CENTER | Age: 76
End: 2021-10-08

## 2021-10-12 ENCOUNTER — APPOINTMENT (EMERGENCY)
Dept: RADIOLOGY | Facility: HOSPITAL | Age: 76
DRG: 871 | End: 2021-10-12
Payer: MEDICARE

## 2021-10-12 ENCOUNTER — APPOINTMENT (INPATIENT)
Dept: RADIOLOGY | Facility: HOSPITAL | Age: 76
DRG: 871 | End: 2021-10-12
Payer: MEDICARE

## 2021-10-12 ENCOUNTER — HOSPITAL ENCOUNTER (INPATIENT)
Facility: HOSPITAL | Age: 76
LOS: 17 days | Discharge: NURSING, REHAB OR LONG-TERM CARE | DRG: 871 | End: 2021-10-29
Attending: SURGERY | Admitting: INTERNAL MEDICINE
Payer: MEDICARE

## 2021-10-12 DIAGNOSIS — E11.9 DM2 (DIABETES MELLITUS, TYPE 2) (HCC): ICD-10-CM

## 2021-10-12 DIAGNOSIS — L98.422 SKIN ULCER OF SACRUM WITH FAT LAYER EXPOSED (HCC): ICD-10-CM

## 2021-10-12 DIAGNOSIS — Z95.0 HISTORY OF CARDIAC PACEMAKER: ICD-10-CM

## 2021-10-12 DIAGNOSIS — R65.21 SEPTIC SHOCK (HCC): ICD-10-CM

## 2021-10-12 DIAGNOSIS — E11.10 DKA (DIABETIC KETOACIDOSIS) (HCC): ICD-10-CM

## 2021-10-12 DIAGNOSIS — A41.9 SEPTIC SHOCK (HCC): ICD-10-CM

## 2021-10-12 DIAGNOSIS — J96.01 ACUTE RESPIRATORY FAILURE WITH HYPOXIA (HCC): ICD-10-CM

## 2021-10-12 DIAGNOSIS — C61 PROSTATE CANCER (HCC): ICD-10-CM

## 2021-10-12 DIAGNOSIS — K21.9 GERD (GASTROESOPHAGEAL REFLUX DISEASE): ICD-10-CM

## 2021-10-12 DIAGNOSIS — L98.429 SACRAL ULCER (HCC): ICD-10-CM

## 2021-10-12 DIAGNOSIS — E03.9 HYPOTHYROIDISM: ICD-10-CM

## 2021-10-12 DIAGNOSIS — I95.9 HYPOTENSION: Primary | ICD-10-CM

## 2021-10-12 DIAGNOSIS — E78.5 HYPERLIPIDEMIA: ICD-10-CM

## 2021-10-12 DIAGNOSIS — J90 PLEURAL EFFUSION: ICD-10-CM

## 2021-10-12 DIAGNOSIS — L89.619 PRESSURE INJURY OF SKIN OF RIGHT HEEL, UNSPECIFIED INJURY STAGE: ICD-10-CM

## 2021-10-12 PROBLEM — N17.9 AKI (ACUTE KIDNEY INJURY) (HCC): Status: ACTIVE | Noted: 2021-10-12

## 2021-10-12 PROBLEM — J18.9 PNA (PNEUMONIA): Status: ACTIVE | Noted: 2021-10-12

## 2021-10-12 LAB
ALBUMIN SERPL BCP-MCNC: 2.4 G/DL (ref 3.5–5)
ALP SERPL-CCNC: 106 U/L (ref 46–116)
ALT SERPL W P-5'-P-CCNC: 21 U/L (ref 12–78)
ANION GAP SERPL CALCULATED.3IONS-SCNC: 24 MMOL/L (ref 4–13)
ANION GAP SERPL CALCULATED.3IONS-SCNC: 28 MMOL/L (ref 4–13)
ANION GAP SERPL CALCULATED.3IONS-SCNC: 29 MMOL/L (ref 4–13)
ARTIFACT: PRESENT
AST SERPL W P-5'-P-CCNC: 29 U/L (ref 5–45)
ATRIAL RATE: 85 BPM
ATRIAL RATE: 86 BPM
BACTERIA UR QL AUTO: ABNORMAL /HPF
BASE EXCESS BLDA CALC-SCNC: -16.6 MMOL/L
BASE EXCESS BLDA CALC-SCNC: -22 MMOL/L (ref -2–3)
BASOPHILS # BLD MANUAL: 0 THOUSAND/UL (ref 0–0.1)
BASOPHILS NFR MAR MANUAL: 0 % (ref 0–1)
BETA-HYDROXYBUTYRATE: 5.7 MMOL/L
BILIRUB SERPL-MCNC: 0.68 MG/DL (ref 0.2–1)
BILIRUB UR QL STRIP: ABNORMAL
BUN SERPL-MCNC: 103 MG/DL (ref 5–25)
BUN SERPL-MCNC: 104 MG/DL (ref 5–25)
BUN SERPL-MCNC: 110 MG/DL (ref 5–25)
BURR CELLS BLD QL SMEAR: PRESENT
CALCIUM ALBUM COR SERPL-MCNC: 9.4 MG/DL (ref 8.3–10.1)
CALCIUM SERPL-MCNC: 7.1 MG/DL (ref 8.3–10.1)
CALCIUM SERPL-MCNC: 7.1 MG/DL (ref 8.3–10.1)
CALCIUM SERPL-MCNC: 8.1 MG/DL (ref 8.3–10.1)
CHLORIDE SERPL-SCNC: 91 MMOL/L (ref 100–108)
CHLORIDE SERPL-SCNC: 93 MMOL/L (ref 100–108)
CHLORIDE SERPL-SCNC: 97 MMOL/L (ref 100–108)
CK MB SERPL-MCNC: 18.1 NG/ML (ref 0–5)
CK MB SERPL-MCNC: 3.8 % (ref 0–2.5)
CK SERPL-CCNC: 481 U/L (ref 39–308)
CLARITY UR: ABNORMAL
CO2 SERPL-SCNC: 7 MMOL/L (ref 21–32)
CO2 SERPL-SCNC: 7 MMOL/L (ref 21–32)
CO2 SERPL-SCNC: 9 MMOL/L (ref 21–32)
COLOR UR: YELLOW
CREAT SERPL-MCNC: 2.76 MG/DL (ref 0.6–1.3)
CREAT SERPL-MCNC: 2.82 MG/DL (ref 0.6–1.3)
CREAT SERPL-MCNC: 3.23 MG/DL (ref 0.6–1.3)
EOSINOPHIL # BLD MANUAL: 0 THOUSAND/UL (ref 0–0.4)
EOSINOPHIL NFR BLD MANUAL: 0 % (ref 0–6)
ERYTHROCYTE [DISTWIDTH] IN BLOOD BY AUTOMATED COUNT: 12.8 % (ref 11.6–15.1)
FLUAV RNA RESP QL NAA+PROBE: NEGATIVE
FLUBV RNA RESP QL NAA+PROBE: NEGATIVE
GFR SERPL CREATININE-BSD FRML MDRD: 18 ML/MIN/1.73SQ M
GFR SERPL CREATININE-BSD FRML MDRD: 21 ML/MIN/1.73SQ M
GFR SERPL CREATININE-BSD FRML MDRD: 21 ML/MIN/1.73SQ M
GLUCOSE SERPL-MCNC: 363 MG/DL (ref 65–140)
GLUCOSE SERPL-MCNC: 418 MG/DL (ref 65–140)
GLUCOSE SERPL-MCNC: 425 MG/DL (ref 65–140)
GLUCOSE SERPL-MCNC: 483 MG/DL (ref 65–140)
GLUCOSE SERPL-MCNC: 576 MG/DL (ref 65–140)
GLUCOSE SERPL-MCNC: 716 MG/DL (ref 65–140)
GLUCOSE SERPL-MCNC: 850 MG/DL (ref 65–140)
GLUCOSE SERPL-MCNC: >600 MG/DL (ref 65–140)
GLUCOSE UR STRIP-MCNC: ABNORMAL MG/DL
HCO3 BLDA-SCNC: 6.3 MMOL/L (ref 24–30)
HCO3 BLDA-SCNC: 8.8 MMOL/L (ref 22–28)
HCT VFR BLD AUTO: 37.3 % (ref 36.5–49.3)
HCT VFR BLD CALC: 37 % (ref 36.5–49.3)
HGB BLD-MCNC: 13.1 G/DL (ref 12–17)
HGB BLDA-MCNC: 12.6 G/DL (ref 12–17)
HGB UR QL STRIP.AUTO: ABNORMAL
HOLD SPECIMEN: NORMAL
HYALINE CASTS #/AREA URNS LPF: ABNORMAL /LPF
KETONES UR STRIP-MCNC: ABNORMAL MG/DL
LACTATE SERPL-SCNC: 1.9 MMOL/L (ref 0.5–2)
LACTATE SERPL-SCNC: 2 MMOL/L (ref 0.5–2)
LEUKOCYTE ESTERASE UR QL STRIP: ABNORMAL
LYMPHOCYTES # BLD AUTO: 0 % (ref 14–44)
LYMPHOCYTES # BLD AUTO: 0 THOUSAND/UL (ref 0.6–4.47)
MAGNESIUM SERPL-MCNC: 3 MG/DL (ref 1.6–2.6)
MAGNESIUM SERPL-MCNC: 3.1 MG/DL (ref 1.6–2.6)
MAGNESIUM SERPL-MCNC: 3.2 MG/DL (ref 1.6–2.6)
MCH RBC QN AUTO: 31.2 PG (ref 26.8–34.3)
MCHC RBC AUTO-ENTMCNC: 35.1 G/DL (ref 31.4–37.4)
MCV RBC AUTO: 89 FL (ref 82–98)
METAMYELOCYTES NFR BLD MANUAL: 2 % (ref 0–1)
MONOCYTES # BLD AUTO: 1.07 THOUSAND/UL (ref 0–1.22)
MONOCYTES NFR BLD: 7 % (ref 4–12)
NEUTROPHILS # BLD MANUAL: 13.95 THOUSAND/UL (ref 1.85–7.62)
NEUTS BAND NFR BLD MANUAL: 41 % (ref 0–8)
NEUTS SEG NFR BLD AUTO: 50 % (ref 43–75)
NITRITE UR QL STRIP: NEGATIVE
NON-SQ EPI CELLS URNS QL MICRO: ABNORMAL /HPF
O2 CT BLDA-SCNC: 14.8 ML/DL (ref 16–23)
OXYHGB MFR BLDA: 91.8 % (ref 94–97)
P AXIS: 70 DEGREES
P AXIS: 73 DEGREES
PCO2 BLD: 21.8 MM HG (ref 42–50)
PCO2 BLD: 7 MMOL/L (ref 21–32)
PCO2 BLDA: 20.9 MM HG (ref 36–44)
PH BLD: 7.07 [PH] (ref 7.3–7.4)
PH BLDA: 7.24 [PH] (ref 7.35–7.45)
PH UR STRIP.AUTO: 5 [PH]
PHOSPHATE SERPL-MCNC: 2.7 MG/DL (ref 2.3–4.1)
PHOSPHATE SERPL-MCNC: 5 MG/DL (ref 2.3–4.1)
PHOSPHATE SERPL-MCNC: 7.4 MG/DL (ref 2.3–4.1)
PLATELET # BLD AUTO: 101 THOUSANDS/UL (ref 149–390)
PLATELET # BLD AUTO: 136 THOUSANDS/UL (ref 149–390)
PLATELET BLD QL SMEAR: ABNORMAL
PMV BLD AUTO: 12.5 FL (ref 8.9–12.7)
PMV BLD AUTO: 13.4 FL (ref 8.9–12.7)
PO2 BLD: 42 MM HG (ref 35–45)
PO2 BLDA: 66 MM HG (ref 75–129)
POTASSIUM BLD-SCNC: 3.9 MMOL/L (ref 3.5–5.3)
POTASSIUM SERPL-SCNC: 3.1 MMOL/L (ref 3.5–5.3)
POTASSIUM SERPL-SCNC: 3.5 MMOL/L (ref 3.5–5.3)
POTASSIUM SERPL-SCNC: 3.8 MMOL/L (ref 3.5–5.3)
PR INTERVAL: 158 MS
PR INTERVAL: 160 MS
PROCALCITONIN SERPL-MCNC: 5.16 NG/ML
PROT SERPL-MCNC: 5.9 G/DL (ref 6.4–8.2)
PROT UR STRIP-MCNC: ABNORMAL MG/DL
QRS AXIS: -55 DEGREES
QRS AXIS: -56 DEGREES
QRSD INTERVAL: 136 MS
QRSD INTERVAL: 140 MS
QT INTERVAL: 472 MS
QT INTERVAL: 498 MS
QTC INTERVAL: 561 MS
QTC INTERVAL: 592 MS
RBC # BLD AUTO: 4.2 MILLION/UL (ref 3.88–5.62)
RBC #/AREA URNS AUTO: ABNORMAL /HPF
RBC MORPH BLD: PRESENT
RSV RNA RESP QL NAA+PROBE: NEGATIVE
SAO2 % BLD FROM PO2: 59 % (ref 60–85)
SARS-COV-2 RNA RESP QL NAA+PROBE: NEGATIVE
SODIUM BLD-SCNC: 126 MMOL/L (ref 136–145)
SODIUM SERPL-SCNC: 126 MMOL/L (ref 136–145)
SODIUM SERPL-SCNC: 129 MMOL/L (ref 136–145)
SODIUM SERPL-SCNC: 130 MMOL/L (ref 136–145)
SP GR UR STRIP.AUTO: 1.02 (ref 1–1.03)
SPECIMEN SOURCE: ABNORMAL
SPECIMEN SOURCE: ABNORMAL
T WAVE AXIS: 53 DEGREES
T WAVE AXIS: 70 DEGREES
TROPONIN I SERPL-MCNC: 0.12 NG/ML
TROPONIN I SERPL-MCNC: 0.14 NG/ML
TROPONIN I SERPL-MCNC: 0.21 NG/ML
TSH SERPL DL<=0.05 MIU/L-ACNC: 1.86 UIU/ML (ref 0.36–3.74)
UROBILINOGEN UR QL STRIP.AUTO: 1 E.U./DL
VENTRICULAR RATE: 85 BPM
VENTRICULAR RATE: 85 BPM
WBC # BLD AUTO: 15.33 THOUSAND/UL (ref 4.31–10.16)
WBC #/AREA URNS AUTO: ABNORMAL /HPF

## 2021-10-12 PROCEDURE — 84295 ASSAY OF SERUM SODIUM: CPT

## 2021-10-12 PROCEDURE — 83735 ASSAY OF MAGNESIUM: CPT | Performed by: PHYSICIAN ASSISTANT

## 2021-10-12 PROCEDURE — 84100 ASSAY OF PHOSPHORUS: CPT | Performed by: INTERNAL MEDICINE

## 2021-10-12 PROCEDURE — 71260 CT THORAX DX C+: CPT

## 2021-10-12 PROCEDURE — 82550 ASSAY OF CK (CPK): CPT | Performed by: PHYSICIAN ASSISTANT

## 2021-10-12 PROCEDURE — 96365 THER/PROPH/DIAG IV INF INIT: CPT

## 2021-10-12 PROCEDURE — 82805 BLOOD GASES W/O2 SATURATION: CPT | Performed by: REGISTERED NURSE

## 2021-10-12 PROCEDURE — 99291 CRITICAL CARE FIRST HOUR: CPT | Performed by: SURGERY

## 2021-10-12 PROCEDURE — 87086 URINE CULTURE/COLONY COUNT: CPT | Performed by: SURGERY

## 2021-10-12 PROCEDURE — 84100 ASSAY OF PHOSPHORUS: CPT | Performed by: PHYSICIAN ASSISTANT

## 2021-10-12 PROCEDURE — 84100 ASSAY OF PHOSPHORUS: CPT

## 2021-10-12 PROCEDURE — 80048 BASIC METABOLIC PNL TOTAL CA: CPT | Performed by: INTERNAL MEDICINE

## 2021-10-12 PROCEDURE — 93005 ELECTROCARDIOGRAM TRACING: CPT

## 2021-10-12 PROCEDURE — 36415 COLL VENOUS BLD VENIPUNCTURE: CPT

## 2021-10-12 PROCEDURE — 82553 CREATINE MB FRACTION: CPT | Performed by: PHYSICIAN ASSISTANT

## 2021-10-12 PROCEDURE — 84132 ASSAY OF SERUM POTASSIUM: CPT

## 2021-10-12 PROCEDURE — 4A133J1 MONITORING OF ARTERIAL PULSE, PERIPHERAL, PERCUTANEOUS APPROACH: ICD-10-PCS | Performed by: ANESTHESIOLOGY

## 2021-10-12 PROCEDURE — 03HY32Z INSERTION OF MONITORING DEVICE INTO UPPER ARTERY, PERCUTANEOUS APPROACH: ICD-10-PCS | Performed by: ANESTHESIOLOGY

## 2021-10-12 PROCEDURE — 36556 INSERT NON-TUNNEL CV CATH: CPT | Performed by: ANESTHESIOLOGY

## 2021-10-12 PROCEDURE — 72125 CT NECK SPINE W/O DYE: CPT

## 2021-10-12 PROCEDURE — 76705 ECHO EXAM OF ABDOMEN: CPT | Performed by: SURGERY

## 2021-10-12 PROCEDURE — 70450 CT HEAD/BRAIN W/O DYE: CPT

## 2021-10-12 PROCEDURE — 71045 X-RAY EXAM CHEST 1 VIEW: CPT

## 2021-10-12 PROCEDURE — 87449 NOS EACH ORGANISM AG IA: CPT | Performed by: INTERNAL MEDICINE

## 2021-10-12 PROCEDURE — 0241U HB NFCT DS VIR RESP RNA 4 TRGT: CPT | Performed by: SURGERY

## 2021-10-12 PROCEDURE — 84443 ASSAY THYROID STIM HORMONE: CPT

## 2021-10-12 PROCEDURE — 82947 ASSAY GLUCOSE BLOOD QUANT: CPT

## 2021-10-12 PROCEDURE — 99291 CRITICAL CARE FIRST HOUR: CPT | Performed by: ANESTHESIOLOGY

## 2021-10-12 PROCEDURE — 85049 AUTOMATED PLATELET COUNT: CPT | Performed by: PHYSICIAN ASSISTANT

## 2021-10-12 PROCEDURE — NC001 PR NO CHARGE: Performed by: ANESTHESIOLOGY

## 2021-10-12 PROCEDURE — 82948 REAGENT STRIP/BLOOD GLUCOSE: CPT

## 2021-10-12 PROCEDURE — 85014 HEMATOCRIT: CPT

## 2021-10-12 PROCEDURE — 83735 ASSAY OF MAGNESIUM: CPT

## 2021-10-12 PROCEDURE — 1124F ACP DISCUSS-NO DSCNMKR DOCD: CPT | Performed by: EMERGENCY MEDICINE

## 2021-10-12 PROCEDURE — 99285 EMERGENCY DEPT VISIT HI MDM: CPT

## 2021-10-12 PROCEDURE — 84145 PROCALCITONIN (PCT): CPT | Performed by: SURGERY

## 2021-10-12 PROCEDURE — 82010 KETONE BODYS QUAN: CPT | Performed by: SURGERY

## 2021-10-12 PROCEDURE — 81001 URINALYSIS AUTO W/SCOPE: CPT | Performed by: SURGERY

## 2021-10-12 PROCEDURE — 85027 COMPLETE CBC AUTOMATED: CPT | Performed by: SURGERY

## 2021-10-12 PROCEDURE — 84484 ASSAY OF TROPONIN QUANT: CPT | Performed by: SURGERY

## 2021-10-12 PROCEDURE — 36620 INSERTION CATHETER ARTERY: CPT | Performed by: ANESTHESIOLOGY

## 2021-10-12 PROCEDURE — 93308 TTE F-UP OR LMTD: CPT | Performed by: SURGERY

## 2021-10-12 PROCEDURE — 83735 ASSAY OF MAGNESIUM: CPT | Performed by: INTERNAL MEDICINE

## 2021-10-12 PROCEDURE — 85007 BL SMEAR W/DIFF WBC COUNT: CPT | Performed by: SURGERY

## 2021-10-12 PROCEDURE — 84484 ASSAY OF TROPONIN QUANT: CPT | Performed by: REGISTERED NURSE

## 2021-10-12 PROCEDURE — 4A133B1 MONITORING OF ARTERIAL PRESSURE, PERIPHERAL, PERCUTANEOUS APPROACH: ICD-10-PCS | Performed by: ANESTHESIOLOGY

## 2021-10-12 PROCEDURE — 82803 BLOOD GASES ANY COMBINATION: CPT

## 2021-10-12 PROCEDURE — 83605 ASSAY OF LACTIC ACID: CPT | Performed by: REGISTERED NURSE

## 2021-10-12 PROCEDURE — 80053 COMPREHEN METABOLIC PANEL: CPT

## 2021-10-12 PROCEDURE — 80048 BASIC METABOLIC PNL TOTAL CA: CPT | Performed by: PHYSICIAN ASSISTANT

## 2021-10-12 PROCEDURE — 83605 ASSAY OF LACTIC ACID: CPT

## 2021-10-12 PROCEDURE — 93010 ELECTROCARDIOGRAM REPORT: CPT | Performed by: INTERNAL MEDICINE

## 2021-10-12 PROCEDURE — 87040 BLOOD CULTURE FOR BACTERIA: CPT | Performed by: SURGERY

## 2021-10-12 PROCEDURE — NC001 PR NO CHARGE: Performed by: EMERGENCY MEDICINE

## 2021-10-12 PROCEDURE — 96367 TX/PROPH/DG ADDL SEQ IV INF: CPT

## 2021-10-12 PROCEDURE — 74177 CT ABD & PELVIS W/CONTRAST: CPT

## 2021-10-12 RX ORDER — MIDAZOLAM HYDROCHLORIDE 2 MG/2ML
2 INJECTION, SOLUTION INTRAMUSCULAR; INTRAVENOUS ONCE
Status: COMPLETED | OUTPATIENT
Start: 2021-10-12 | End: 2021-10-13

## 2021-10-12 RX ORDER — DEXTROSE, SODIUM CHLORIDE, SODIUM LACTATE, POTASSIUM CHLORIDE, AND CALCIUM CHLORIDE 5; .6; .31; .03; .02 G/100ML; G/100ML; G/100ML; G/100ML; G/100ML
250 INJECTION, SOLUTION INTRAVENOUS CONTINUOUS
Status: DISCONTINUED | OUTPATIENT
Start: 2021-10-12 | End: 2021-10-13

## 2021-10-12 RX ORDER — SODIUM CHLORIDE, SODIUM GLUCONATE, SODIUM ACETATE, POTASSIUM CHLORIDE, MAGNESIUM CHLORIDE, SODIUM PHOSPHATE, DIBASIC, AND POTASSIUM PHOSPHATE .53; .5; .37; .037; .03; .012; .00082 G/100ML; G/100ML; G/100ML; G/100ML; G/100ML; G/100ML; G/100ML
250 INJECTION, SOLUTION INTRAVENOUS CONTINUOUS
Status: DISCONTINUED | OUTPATIENT
Start: 2021-10-12 | End: 2021-10-13

## 2021-10-12 RX ORDER — SODIUM CHLORIDE, SODIUM GLUCONATE, SODIUM ACETATE, POTASSIUM CHLORIDE, MAGNESIUM CHLORIDE, SODIUM PHOSPHATE, DIBASIC, AND POTASSIUM PHOSPHATE .53; .5; .37; .037; .03; .012; .00082 G/100ML; G/100ML; G/100ML; G/100ML; G/100ML; G/100ML; G/100ML
1000 INJECTION, SOLUTION INTRAVENOUS ONCE
Status: COMPLETED | OUTPATIENT
Start: 2021-10-12 | End: 2021-10-12

## 2021-10-12 RX ORDER — SODIUM CHLORIDE, SODIUM GLUCONATE, SODIUM ACETATE, POTASSIUM CHLORIDE, MAGNESIUM CHLORIDE, SODIUM PHOSPHATE, DIBASIC, AND POTASSIUM PHOSPHATE .53; .5; .37; .037; .03; .012; .00082 G/100ML; G/100ML; G/100ML; G/100ML; G/100ML; G/100ML; G/100ML
2000 INJECTION, SOLUTION INTRAVENOUS ONCE
Status: COMPLETED | OUTPATIENT
Start: 2021-10-12 | End: 2021-10-12

## 2021-10-12 RX ORDER — VANCOMYCIN HYDROCHLORIDE 1 G/200ML
15 INJECTION, SOLUTION INTRAVENOUS DAILY PRN
Status: DISCONTINUED | OUTPATIENT
Start: 2021-10-13 | End: 2021-10-14

## 2021-10-12 RX ORDER — POTASSIUM CHLORIDE 29.8 MG/ML
40 INJECTION INTRAVENOUS ONCE
Status: COMPLETED | OUTPATIENT
Start: 2021-10-12 | End: 2021-10-13

## 2021-10-12 RX ORDER — SODIUM CHLORIDE, SODIUM GLUCONATE, SODIUM ACETATE, POTASSIUM CHLORIDE, MAGNESIUM CHLORIDE, SODIUM PHOSPHATE, DIBASIC, AND POTASSIUM PHOSPHATE .53; .5; .37; .037; .03; .012; .00082 G/100ML; G/100ML; G/100ML; G/100ML; G/100ML; G/100ML; G/100ML
1000 INJECTION, SOLUTION INTRAVENOUS ONCE
Status: DISCONTINUED | OUTPATIENT
Start: 2021-10-12 | End: 2021-10-15

## 2021-10-12 RX ORDER — NOREPINEPHRINE BITARTRATE 1 MG/ML
INJECTION, SOLUTION INTRAVENOUS
Status: DISPENSED
Start: 2021-10-12 | End: 2021-10-13

## 2021-10-12 RX ORDER — POTASSIUM CHLORIDE 14.9 MG/ML
20 INJECTION INTRAVENOUS ONCE
Status: COMPLETED | OUTPATIENT
Start: 2021-10-12 | End: 2021-10-12

## 2021-10-12 RX ORDER — PANTOPRAZOLE SODIUM 40 MG/1
40 INJECTION, POWDER, FOR SOLUTION INTRAVENOUS
Status: DISCONTINUED | OUTPATIENT
Start: 2021-10-13 | End: 2021-10-14

## 2021-10-12 RX ORDER — ALBUMIN (HUMAN) 12.5 G/50ML
12.5 SOLUTION INTRAVENOUS ONCE
Status: DISCONTINUED | OUTPATIENT
Start: 2021-10-12 | End: 2021-10-12

## 2021-10-12 RX ORDER — HEPARIN SODIUM 5000 [USP'U]/ML
5000 INJECTION, SOLUTION INTRAVENOUS; SUBCUTANEOUS EVERY 8 HOURS SCHEDULED
Status: DISCONTINUED | OUTPATIENT
Start: 2021-10-12 | End: 2021-10-29 | Stop reason: HOSPADM

## 2021-10-12 RX ADMIN — SODIUM CHLORIDE, SODIUM GLUCONATE, SODIUM ACETATE, POTASSIUM CHLORIDE, MAGNESIUM CHLORIDE, SODIUM PHOSPHATE, DIBASIC, AND POTASSIUM PHOSPHATE 1000 ML: .53; .5; .37; .037; .03; .012; .00082 INJECTION, SOLUTION INTRAVENOUS at 16:23

## 2021-10-12 RX ADMIN — SODIUM BICARBONATE 50 MEQ: 84 INJECTION INTRAVENOUS at 22:05

## 2021-10-12 RX ADMIN — NOREPINEPHRINE BITARTRATE 10 MCG/MIN: 1 INJECTION, SOLUTION, CONCENTRATE INTRAVENOUS at 20:15

## 2021-10-12 RX ADMIN — HEPARIN SODIUM 5000 UNITS: 5000 INJECTION INTRAVENOUS; SUBCUTANEOUS at 21:51

## 2021-10-12 RX ADMIN — SODIUM CHLORIDE, SODIUM GLUCONATE, SODIUM ACETATE, POTASSIUM CHLORIDE, MAGNESIUM CHLORIDE, SODIUM PHOSPHATE, DIBASIC, AND POTASSIUM PHOSPHATE 1000 ML: .53; .5; .37; .037; .03; .012; .00082 INJECTION, SOLUTION INTRAVENOUS at 19:45

## 2021-10-12 RX ADMIN — INSULIN HUMAN 10 UNITS: 100 INJECTION, SOLUTION PARENTERAL at 18:49

## 2021-10-12 RX ADMIN — VANCOMYCIN HYDROCHLORIDE 1250 MG: 10 INJECTION, POWDER, LYOPHILIZED, FOR SOLUTION INTRAVENOUS at 20:48

## 2021-10-12 RX ADMIN — POTASSIUM CHLORIDE 40 MEQ: 29.8 INJECTION, SOLUTION INTRAVENOUS at 23:08

## 2021-10-12 RX ADMIN — SODIUM CHLORIDE, SODIUM GLUCONATE, SODIUM ACETATE, POTASSIUM CHLORIDE, MAGNESIUM CHLORIDE, SODIUM PHOSPHATE, DIBASIC, AND POTASSIUM PHOSPHATE 250 ML/HR: .53; .5; .37; .037; .03; .012; .00082 INJECTION, SOLUTION INTRAVENOUS at 20:42

## 2021-10-12 RX ADMIN — POTASSIUM CHLORIDE 20 MEQ: 14.9 INJECTION, SOLUTION INTRAVENOUS at 21:07

## 2021-10-12 RX ADMIN — VASOPRESSIN 0.04 UNITS/MIN: 20 INJECTION INTRAVENOUS at 22:20

## 2021-10-12 RX ADMIN — CEFTRIAXONE SODIUM 2000 MG: 10 INJECTION, POWDER, FOR SOLUTION INTRAVENOUS at 17:06

## 2021-10-12 RX ADMIN — IOHEXOL 100 ML: 350 INJECTION, SOLUTION INTRAVENOUS at 16:01

## 2021-10-12 RX ADMIN — SODIUM CHLORIDE, SODIUM GLUCONATE, SODIUM ACETATE, POTASSIUM CHLORIDE, MAGNESIUM CHLORIDE, SODIUM PHOSPHATE, DIBASIC, AND POTASSIUM PHOSPHATE 2000 ML: .53; .5; .37; .037; .03; .012; .00082 INJECTION, SOLUTION INTRAVENOUS at 18:33

## 2021-10-12 RX ADMIN — POTASSIUM CHLORIDE 20 MEQ: 14.9 INJECTION, SOLUTION INTRAVENOUS at 18:33

## 2021-10-12 RX ADMIN — SODIUM CHLORIDE, SODIUM LACTATE, POTASSIUM CHLORIDE, AND CALCIUM CHLORIDE 1000 ML: .6; .31; .03; .02 INJECTION, SOLUTION INTRAVENOUS at 22:05

## 2021-10-12 RX ADMIN — SODIUM CHLORIDE 6.6 UNITS/HR: 9 INJECTION, SOLUTION INTRAVENOUS at 17:29

## 2021-10-13 ENCOUNTER — APPOINTMENT (INPATIENT)
Dept: RADIOLOGY | Facility: HOSPITAL | Age: 76
DRG: 871 | End: 2021-10-13
Payer: MEDICARE

## 2021-10-13 ENCOUNTER — APPOINTMENT (INPATIENT)
Dept: NON INVASIVE DIAGNOSTICS | Facility: HOSPITAL | Age: 76
DRG: 871 | End: 2021-10-13
Payer: MEDICARE

## 2021-10-13 ENCOUNTER — APPOINTMENT (INPATIENT)
Dept: GASTROENTEROLOGY | Facility: HOSPITAL | Age: 76
DRG: 871 | End: 2021-10-13
Payer: MEDICARE

## 2021-10-13 LAB
AMMONIA PLAS-SCNC: 41 UMOL/L (ref 11–35)
AMPHETAMINES SERPL QL SCN: NEGATIVE
ANION GAP SERPL CALCULATED.3IONS-SCNC: 12 MMOL/L (ref 4–13)
ANION GAP SERPL CALCULATED.3IONS-SCNC: 13 MMOL/L (ref 4–13)
ANION GAP SERPL CALCULATED.3IONS-SCNC: 14 MMOL/L (ref 4–13)
ANION GAP SERPL CALCULATED.3IONS-SCNC: 16 MMOL/L (ref 4–13)
ANION GAP SERPL CALCULATED.3IONS-SCNC: 9 MMOL/L (ref 4–13)
AORTIC ROOT: 3.2 CM
APAP SERPL-MCNC: 7 UG/ML (ref 10–20)
APICAL FOUR CHAMBER EJECTION FRACTION: 53 %
ASCENDING AORTA: 2.6 CM
ATRIAL RATE: 107 BPM
ATRIAL RATE: 115 BPM
BACTERIA UR CULT: NORMAL
BARBITURATES UR QL: NEGATIVE
BASE EX.OXY STD BLDV CALC-SCNC: 67.2 % (ref 60–80)
BASE EX.OXY STD BLDV CALC-SCNC: 67.9 % (ref 60–80)
BASE EXCESS BLDV CALC-SCNC: -4.7 MMOL/L
BASE EXCESS BLDV CALC-SCNC: -5.1 MMOL/L
BENZODIAZ UR QL: POSITIVE
BODY TEMPERATURE: 100.4 DEGREES FEHRENHEIT
BUN SERPL-MCNC: 55 MG/DL (ref 5–25)
BUN SERPL-MCNC: 68 MG/DL (ref 5–25)
BUN SERPL-MCNC: 76 MG/DL (ref 5–25)
BUN SERPL-MCNC: 82 MG/DL (ref 5–25)
BUN SERPL-MCNC: 89 MG/DL (ref 5–25)
CA-I BLD-SCNC: 0.88 MMOL/L (ref 1.12–1.32)
CALCIUM SERPL-MCNC: 6.7 MG/DL (ref 8.3–10.1)
CALCIUM SERPL-MCNC: 7 MG/DL (ref 8.3–10.1)
CALCIUM SERPL-MCNC: 7.1 MG/DL (ref 8.3–10.1)
CALCIUM SERPL-MCNC: 7.3 MG/DL (ref 8.3–10.1)
CALCIUM SERPL-MCNC: 7.4 MG/DL (ref 8.3–10.1)
CHLORIDE SERPL-SCNC: 102 MMOL/L (ref 100–108)
CHLORIDE SERPL-SCNC: 107 MMOL/L (ref 100–108)
CHLORIDE SERPL-SCNC: 110 MMOL/L (ref 100–108)
CHLORIDE SERPL-SCNC: 111 MMOL/L (ref 100–108)
CHLORIDE SERPL-SCNC: 112 MMOL/L (ref 100–108)
CO2 SERPL-SCNC: 17 MMOL/L (ref 21–32)
CO2 SERPL-SCNC: 17 MMOL/L (ref 21–32)
CO2 SERPL-SCNC: 18 MMOL/L (ref 21–32)
CO2 SERPL-SCNC: 18 MMOL/L (ref 21–32)
CO2 SERPL-SCNC: 20 MMOL/L (ref 21–32)
COCAINE UR QL: NEGATIVE
CREAT SERPL-MCNC: 1.17 MG/DL (ref 0.6–1.3)
CREAT SERPL-MCNC: 1.75 MG/DL (ref 0.6–1.3)
CREAT SERPL-MCNC: 1.96 MG/DL (ref 0.6–1.3)
CREAT SERPL-MCNC: 2.16 MG/DL (ref 0.6–1.3)
CREAT SERPL-MCNC: 2.45 MG/DL (ref 0.6–1.3)
E WAVE DECELERATION TIME: 126 MS
ERYTHROCYTE [DISTWIDTH] IN BLOOD BY AUTOMATED COUNT: 12.8 % (ref 11.6–15.1)
EST. AVERAGE GLUCOSE BLD GHB EST-MCNC: 338 MG/DL
ETHANOL SERPL-MCNC: <3 MG/DL (ref 0–3)
FRACTIONAL SHORTENING: 20 % (ref 28–44)
GFR SERPL CREATININE-BSD FRML MDRD: 25 ML/MIN/1.73SQ M
GFR SERPL CREATININE-BSD FRML MDRD: 29 ML/MIN/1.73SQ M
GFR SERPL CREATININE-BSD FRML MDRD: 32 ML/MIN/1.73SQ M
GFR SERPL CREATININE-BSD FRML MDRD: 37 ML/MIN/1.73SQ M
GFR SERPL CREATININE-BSD FRML MDRD: 61 ML/MIN/1.73SQ M
GLUCOSE SERPL-MCNC: 108 MG/DL (ref 65–140)
GLUCOSE SERPL-MCNC: 112 MG/DL (ref 65–140)
GLUCOSE SERPL-MCNC: 131 MG/DL (ref 65–140)
GLUCOSE SERPL-MCNC: 133 MG/DL (ref 65–140)
GLUCOSE SERPL-MCNC: 136 MG/DL (ref 65–140)
GLUCOSE SERPL-MCNC: 137 MG/DL (ref 65–140)
GLUCOSE SERPL-MCNC: 145 MG/DL (ref 65–140)
GLUCOSE SERPL-MCNC: 148 MG/DL (ref 65–140)
GLUCOSE SERPL-MCNC: 149 MG/DL (ref 65–140)
GLUCOSE SERPL-MCNC: 152 MG/DL (ref 65–140)
GLUCOSE SERPL-MCNC: 179 MG/DL (ref 65–140)
GLUCOSE SERPL-MCNC: 195 MG/DL (ref 65–140)
GLUCOSE SERPL-MCNC: 197 MG/DL (ref 65–140)
GLUCOSE SERPL-MCNC: 207 MG/DL (ref 65–140)
GLUCOSE SERPL-MCNC: 217 MG/DL (ref 65–140)
GLUCOSE SERPL-MCNC: 222 MG/DL (ref 65–140)
GLUCOSE SERPL-MCNC: 230 MG/DL (ref 65–140)
GLUCOSE SERPL-MCNC: 252 MG/DL (ref 65–140)
GLUCOSE SERPL-MCNC: 277 MG/DL (ref 65–140)
GLUCOSE SERPL-MCNC: 287 MG/DL (ref 65–140)
GLUCOSE SERPL-MCNC: 318 MG/DL (ref 65–140)
GLUCOSE SERPL-MCNC: 349 MG/DL (ref 65–140)
GLUCOSE SERPL-MCNC: 358 MG/DL (ref 65–140)
GLUCOSE SERPL-MCNC: 423 MG/DL (ref 65–140)
GLUCOSE SERPL-MCNC: 55 MG/DL (ref 65–140)
GLUCOSE SERPL-MCNC: 55 MG/DL (ref 65–140)
HBA1C MFR BLD: 13.4 %
HCO3 BLDV-SCNC: 19.1 MMOL/L (ref 24–30)
HCO3 BLDV-SCNC: 20.2 MMOL/L (ref 24–30)
HCT VFR BLD AUTO: 29.5 % (ref 36.5–49.3)
HGB BLD-MCNC: 10.9 G/DL (ref 12–17)
HOROWITZ INDEX BLDA+IHG-RTO: 50 MM[HG]
HOROWITZ INDEX BLDA+IHG-RTO: 50 MM[HG]
INTERVENTRICULAR SEPTUM IN DIASTOLE (PARASTERNAL SHORT AXIS VIEW): 0.7 CM
L PNEUMO1 AG UR QL IA.RAPID: NEGATIVE
LEFT INTERNAL DIMENSION IN SYSTOLE: 4 CM (ref 2.1–4)
LEFT VENTRICULAR INTERNAL DIMENSION IN DIASTOLE: 5 CM (ref 4.56–6.79)
LEFT VENTRICULAR POSTERIOR WALL IN END DIASTOLE: 0.8 CM
LEFT VENTRICULAR STROKE VOLUME: 49 ML
LV EF: 34 %
LYMPHOCYTES # BLD AUTO: 0.13 THOUSAND/UL (ref 0.6–4.47)
LYMPHOCYTES # BLD AUTO: 5 %
MAGNESIUM SERPL-MCNC: 2.2 MG/DL (ref 1.6–2.6)
MAGNESIUM SERPL-MCNC: 2.3 MG/DL (ref 1.6–2.6)
MAGNESIUM SERPL-MCNC: 2.5 MG/DL (ref 1.6–2.6)
MAGNESIUM SERPL-MCNC: 2.7 MG/DL (ref 1.6–2.6)
MAGNESIUM SERPL-MCNC: 2.7 MG/DL (ref 1.6–2.6)
MCH RBC QN AUTO: 30.8 PG (ref 26.8–34.3)
MCHC RBC AUTO-ENTMCNC: 36.9 G/DL (ref 31.4–37.4)
MCV RBC AUTO: 83 FL (ref 82–98)
METHADONE UR QL: NEGATIVE
MONOCYTES # BLD AUTO: 0.13 THOUSAND/UL (ref 0–1.22)
MONOCYTES NFR BLD AUTO: 5 % (ref 4–12)
MV E'TISSUE VEL-SEP: 5 CM/S
MV PEAK A VEL: 0.01 M/S
MV PEAK E VEL: 127 CM/S
MV STENOSIS PRESSURE HALF TIME: 0 MS
NEUTS BAND NFR BLD MANUAL: 29 % (ref 0–8)
NEUTS SEG # BLD: 2.47 THOUSAND/UL (ref 1.81–6.82)
NEUTS SEG NFR BLD AUTO: 61 %
O2 CT BLDV-SCNC: 11.2 ML/DL
O2 CT BLDV-SCNC: 11.2 ML/DL
OPIATES UR QL SCN: NEGATIVE
OXYCODONE+OXYMORPHONE UR QL SCN: NEGATIVE
P AXIS: 55 DEGREES
P AXIS: 63 DEGREES
PA SYSTOLIC PRESSURE: 50 MMHG
PCO2 BLD: 34.6 MM HG (ref 42–50)
PCO2 BLDV: 33.1 MM HG (ref 42–50)
PCO2 BLDV: 36.9 MM HG (ref 42–50)
PCP UR QL: NEGATIVE
PEEP RESPIRATORY: 6 CM[H2O]
PEEP RESPIRATORY: 6 CM[H2O]
PH BLD: 7.37 [PH] (ref 7.3–7.4)
PH BLDV: 7.36 [PH] (ref 7.3–7.4)
PH BLDV: 7.38 [PH] (ref 7.3–7.4)
PHOSPHATE SERPL-MCNC: 0.9 MG/DL (ref 2.3–4.1)
PHOSPHATE SERPL-MCNC: 1.1 MG/DL (ref 2.3–4.1)
PHOSPHATE SERPL-MCNC: 1.5 MG/DL (ref 2.3–4.1)
PHOSPHATE SERPL-MCNC: 1.6 MG/DL (ref 2.3–4.1)
PHOSPHATE SERPL-MCNC: 3.6 MG/DL (ref 2.3–4.1)
PLATELET # BLD AUTO: 101 THOUSANDS/UL (ref 149–390)
PLATELET BLD QL SMEAR: ABNORMAL
PMV BLD AUTO: 12.6 FL (ref 8.9–12.7)
PO2 BLDV: 33.3 MM HG (ref 35–45)
PO2 BLDV: 33.6 MM HG (ref 35–45)
PO2 VENOUS TEMP CORRECTED: 35.7 MM HG (ref 35–45)
POTASSIUM SERPL-SCNC: 3.2 MMOL/L (ref 3.5–5.3)
POTASSIUM SERPL-SCNC: 3.6 MMOL/L (ref 3.5–5.3)
POTASSIUM SERPL-SCNC: 3.7 MMOL/L (ref 3.5–5.3)
POTASSIUM SERPL-SCNC: 4 MMOL/L (ref 3.5–5.3)
POTASSIUM SERPL-SCNC: 4.1 MMOL/L (ref 3.5–5.3)
PR INTERVAL: 125 MS
PR INTERVAL: 138 MS
PROCALCITONIN SERPL-MCNC: 4.48 NG/ML
PROCALCITONIN SERPL-MCNC: 4.52 NG/ML
QRS AXIS: -51 DEGREES
QRS AXIS: -53 DEGREES
QRSD INTERVAL: 121 MS
QRSD INTERVAL: 129 MS
QT INTERVAL: 388 MS
QT INTERVAL: 417 MS
QTC INTERVAL: 537 MS
QTC INTERVAL: 557 MS
RA PRESSURE ESTIMATED: 5 MMHG
RBC # BLD AUTO: 3.54 MILLION/UL (ref 3.88–5.62)
RBC MORPH BLD: NORMAL
RIGHT VENTRICLE ID DIMENSION: 3.2 CM
RV PSP: 45 MMHG
S PNEUM AG UR QL: NEGATIVE
SALICYLATES SERPL-MCNC: <3 MG/DL (ref 3–20)
SL CV LV EF: 50
SL CV PED ECHO LEFT VENTRICLE DIASTOLIC VOLUME (MOD BIPLANE) 2D: 119 ML
SL CV PED ECHO LEFT VENTRICLE SYSTOLIC VOLUME (MOD BIPLANE) 2D: 70 ML
SODIUM SERPL-SCNC: 135 MMOL/L (ref 136–145)
SODIUM SERPL-SCNC: 139 MMOL/L (ref 136–145)
SODIUM SERPL-SCNC: 140 MMOL/L (ref 136–145)
SODIUM SERPL-SCNC: 140 MMOL/L (ref 136–145)
SODIUM SERPL-SCNC: 142 MMOL/L (ref 136–145)
T WAVE AXIS: 51 DEGREES
T WAVE AXIS: 83 DEGREES
THC UR QL: NEGATIVE
TOTAL CELLS COUNTED SPEC: 103
TR PEAK VELOCITY: 3.4 M/S
TRICUSPID VALVE PEAK REGURGITATION VELOCITY: 3.35 M/S
TRICUSPID VALVE S': 1.3 CM/S
TROPONIN I SERPL-MCNC: 0.73 NG/ML
TROPONIN I SERPL-MCNC: 0.86 NG/ML
TROPONIN I SERPL-MCNC: 0.97 NG/ML
TV PEAK GRADIENT: 45 MMHG
VANCOMYCIN SERPL-MCNC: 11.6 UG/ML
VENT AC: 12
VENT AC: 12
VENT- AC: AC
VENT- AC: AC
VENTRICULAR RATE: 107 BPM
VENTRICULAR RATE: 115 BPM
VT SETTING VENT: 450 ML
VT SETTING VENT: 500 ML
WBC # BLD AUTO: 2.74 THOUSAND/UL (ref 4.31–10.16)
Z-SCORE OF LEFT VENTRICULAR DIMENSION IN END SYSTOLE: -1.06

## 2021-10-13 PROCEDURE — 83036 HEMOGLOBIN GLYCOSYLATED A1C: CPT | Performed by: REGISTERED NURSE

## 2021-10-13 PROCEDURE — 87070 CULTURE OTHR SPECIMN AEROBIC: CPT | Performed by: FAMILY MEDICINE

## 2021-10-13 PROCEDURE — 85007 BL SMEAR W/DIFF WBC COUNT: CPT | Performed by: REGISTERED NURSE

## 2021-10-13 PROCEDURE — 87186 SC STD MICRODIL/AGAR DIL: CPT | Performed by: INTERNAL MEDICINE

## 2021-10-13 PROCEDURE — 80179 DRUG ASSAY SALICYLATE: CPT | Performed by: FAMILY MEDICINE

## 2021-10-13 PROCEDURE — 80307 DRUG TEST PRSMV CHEM ANLYZR: CPT | Performed by: FAMILY MEDICINE

## 2021-10-13 PROCEDURE — 83735 ASSAY OF MAGNESIUM: CPT | Performed by: INTERNAL MEDICINE

## 2021-10-13 PROCEDURE — 87077 CULTURE AEROBIC IDENTIFY: CPT | Performed by: INTERNAL MEDICINE

## 2021-10-13 PROCEDURE — 31500 INSERT EMERGENCY AIRWAY: CPT | Performed by: ANESTHESIOLOGY

## 2021-10-13 PROCEDURE — 87281 PNEUMOCYSTIS CARINII AG IF: CPT | Performed by: INTERNAL MEDICINE

## 2021-10-13 PROCEDURE — 84484 ASSAY OF TROPONIN QUANT: CPT | Performed by: REGISTERED NURSE

## 2021-10-13 PROCEDURE — C9113 INJ PANTOPRAZOLE SODIUM, VIA: HCPCS | Performed by: REGISTERED NURSE

## 2021-10-13 PROCEDURE — 99291 CRITICAL CARE FIRST HOUR: CPT | Performed by: INTERNAL MEDICINE

## 2021-10-13 PROCEDURE — 93010 ELECTROCARDIOGRAM REPORT: CPT | Performed by: INTERNAL MEDICINE

## 2021-10-13 PROCEDURE — 94002 VENT MGMT INPAT INIT DAY: CPT

## 2021-10-13 PROCEDURE — C8929 TTE W OR WO FOL WCON,DOPPLER: HCPCS

## 2021-10-13 PROCEDURE — NC001 PR NO CHARGE: Performed by: ANESTHESIOLOGY

## 2021-10-13 PROCEDURE — 80202 ASSAY OF VANCOMYCIN: CPT | Performed by: REGISTERED NURSE

## 2021-10-13 PROCEDURE — 80048 BASIC METABOLIC PNL TOTAL CA: CPT | Performed by: PHYSICIAN ASSISTANT

## 2021-10-13 PROCEDURE — 84100 ASSAY OF PHOSPHORUS: CPT | Performed by: INTERNAL MEDICINE

## 2021-10-13 PROCEDURE — 88112 CYTOPATH CELL ENHANCE TECH: CPT | Performed by: PATHOLOGY

## 2021-10-13 PROCEDURE — 87102 FUNGUS ISOLATION CULTURE: CPT | Performed by: INTERNAL MEDICINE

## 2021-10-13 PROCEDURE — 87015 SPECIMEN INFECT AGNT CONCNTJ: CPT | Performed by: INTERNAL MEDICINE

## 2021-10-13 PROCEDURE — 82805 BLOOD GASES W/O2 SATURATION: CPT | Performed by: REGISTERED NURSE

## 2021-10-13 PROCEDURE — 82077 ASSAY SPEC XCP UR&BREATH IA: CPT | Performed by: FAMILY MEDICINE

## 2021-10-13 PROCEDURE — 0B948ZX DRAINAGE OF RIGHT UPPER LOBE BRONCHUS, VIA NATURAL OR ARTIFICIAL OPENING ENDOSCOPIC, DIAGNOSTIC: ICD-10-PCS | Performed by: INTERNAL MEDICINE

## 2021-10-13 PROCEDURE — 82805 BLOOD GASES W/O2 SATURATION: CPT | Performed by: FAMILY MEDICINE

## 2021-10-13 PROCEDURE — 93005 ELECTROCARDIOGRAM TRACING: CPT

## 2021-10-13 PROCEDURE — 87106 FUNGI IDENTIFICATION YEAST: CPT | Performed by: INTERNAL MEDICINE

## 2021-10-13 PROCEDURE — 87070 CULTURE OTHR SPECIMN AEROBIC: CPT | Performed by: INTERNAL MEDICINE

## 2021-10-13 PROCEDURE — 80143 DRUG ASSAY ACETAMINOPHEN: CPT | Performed by: FAMILY MEDICINE

## 2021-10-13 PROCEDURE — 80048 BASIC METABOLIC PNL TOTAL CA: CPT | Performed by: INTERNAL MEDICINE

## 2021-10-13 PROCEDURE — 87186 SC STD MICRODIL/AGAR DIL: CPT | Performed by: FAMILY MEDICINE

## 2021-10-13 PROCEDURE — 82330 ASSAY OF CALCIUM: CPT | Performed by: REGISTERED NURSE

## 2021-10-13 PROCEDURE — 89051 BODY FLUID CELL COUNT: CPT | Performed by: PATHOLOGY

## 2021-10-13 PROCEDURE — 87252 VIRUS INOCULATION TISSUE: CPT | Performed by: INTERNAL MEDICINE

## 2021-10-13 PROCEDURE — 82948 REAGENT STRIP/BLOOD GLUCOSE: CPT

## 2021-10-13 PROCEDURE — 85027 COMPLETE CBC AUTOMATED: CPT | Performed by: REGISTERED NURSE

## 2021-10-13 PROCEDURE — 94760 N-INVAS EAR/PLS OXIMETRY 1: CPT

## 2021-10-13 PROCEDURE — 0BH18EZ INSERTION OF ENDOTRACHEAL AIRWAY INTO TRACHEA, VIA NATURAL OR ARTIFICIAL OPENING ENDOSCOPIC: ICD-10-PCS | Performed by: ANESTHESIOLOGY

## 2021-10-13 PROCEDURE — 84145 PROCALCITONIN (PCT): CPT | Performed by: REGISTERED NURSE

## 2021-10-13 PROCEDURE — 88305 TISSUE EXAM BY PATHOLOGIST: CPT | Performed by: PATHOLOGY

## 2021-10-13 PROCEDURE — 84100 ASSAY OF PHOSPHORUS: CPT | Performed by: PHYSICIAN ASSISTANT

## 2021-10-13 PROCEDURE — 83735 ASSAY OF MAGNESIUM: CPT | Performed by: PHYSICIAN ASSISTANT

## 2021-10-13 PROCEDURE — 71045 X-RAY EXAM CHEST 1 VIEW: CPT

## 2021-10-13 PROCEDURE — 82140 ASSAY OF AMMONIA: CPT | Performed by: FAMILY MEDICINE

## 2021-10-13 PROCEDURE — 87205 SMEAR GRAM STAIN: CPT | Performed by: INTERNAL MEDICINE

## 2021-10-13 PROCEDURE — 87077 CULTURE AEROBIC IDENTIFY: CPT | Performed by: FAMILY MEDICINE

## 2021-10-13 PROCEDURE — 87081 CULTURE SCREEN ONLY: CPT | Performed by: FAMILY MEDICINE

## 2021-10-13 PROCEDURE — 5A1945Z RESPIRATORY VENTILATION, 24-96 CONSECUTIVE HOURS: ICD-10-PCS | Performed by: ANESTHESIOLOGY

## 2021-10-13 PROCEDURE — 93306 TTE W/DOPPLER COMPLETE: CPT | Performed by: INTERNAL MEDICINE

## 2021-10-13 RX ORDER — CALCIUM GLUCONATE 20 MG/ML
2 INJECTION, SOLUTION INTRAVENOUS ONCE
Status: COMPLETED | OUTPATIENT
Start: 2021-10-13 | End: 2021-10-13

## 2021-10-13 RX ORDER — POTASSIUM CHLORIDE 29.8 MG/ML
40 INJECTION INTRAVENOUS ONCE
Status: DISCONTINUED | OUTPATIENT
Start: 2021-10-13 | End: 2021-10-13

## 2021-10-13 RX ORDER — FENTANYL CITRATE 50 UG/ML
INJECTION, SOLUTION INTRAMUSCULAR; INTRAVENOUS
Status: DISPENSED
Start: 2021-10-13 | End: 2021-10-13

## 2021-10-13 RX ORDER — DEXTROSE MONOHYDRATE 25 G/50ML
INJECTION, SOLUTION INTRAVENOUS
Status: COMPLETED
Start: 2021-10-13 | End: 2021-10-13

## 2021-10-13 RX ORDER — SODIUM CHLORIDE, SODIUM GLUCONATE, SODIUM ACETATE, POTASSIUM CHLORIDE, MAGNESIUM CHLORIDE, SODIUM PHOSPHATE, DIBASIC, AND POTASSIUM PHOSPHATE .53; .5; .37; .037; .03; .012; .00082 G/100ML; G/100ML; G/100ML; G/100ML; G/100ML; G/100ML; G/100ML
150 INJECTION, SOLUTION INTRAVENOUS CONTINUOUS
Status: DISCONTINUED | OUTPATIENT
Start: 2021-10-13 | End: 2021-10-14

## 2021-10-13 RX ORDER — ASPIRIN 81 MG/1
81 TABLET ORAL DAILY
Status: DISCONTINUED | OUTPATIENT
Start: 2021-10-13 | End: 2021-10-14

## 2021-10-13 RX ORDER — FENTANYL CITRATE 50 UG/ML
50 INJECTION, SOLUTION INTRAMUSCULAR; INTRAVENOUS
Status: DISCONTINUED | OUTPATIENT
Start: 2021-10-13 | End: 2021-10-14

## 2021-10-13 RX ORDER — CLOPIDOGREL BISULFATE 75 MG/1
75 TABLET ORAL DAILY
Status: DISCONTINUED | OUTPATIENT
Start: 2021-10-13 | End: 2021-10-15

## 2021-10-13 RX ORDER — VANCOMYCIN HYDROCHLORIDE 500 MG/100ML
500 INJECTION, SOLUTION INTRAVENOUS EVERY 12 HOURS
Status: DISCONTINUED | OUTPATIENT
Start: 2021-10-13 | End: 2021-10-13

## 2021-10-13 RX ORDER — FENTANYL CITRATE-0.9 % NACL/PF 10 MCG/ML
50 PLASTIC BAG, INJECTION (ML) INTRAVENOUS CONTINUOUS
Status: DISCONTINUED | OUTPATIENT
Start: 2021-10-13 | End: 2021-10-14

## 2021-10-13 RX ORDER — VANCOMYCIN HYDROCHLORIDE 1 G/200ML
1000 INJECTION, SOLUTION INTRAVENOUS ONCE
Status: COMPLETED | OUTPATIENT
Start: 2021-10-13 | End: 2021-10-13

## 2021-10-13 RX ORDER — DEXTROSE MONOHYDRATE 25 G/50ML
50 INJECTION, SOLUTION INTRAVENOUS ONCE
Status: COMPLETED | OUTPATIENT
Start: 2021-10-13 | End: 2021-10-13

## 2021-10-13 RX ORDER — SUCCINYLCHOLINE/SOD CL,ISO/PF 100 MG/5ML
80 SYRINGE (ML) INTRAVENOUS ONCE
Status: COMPLETED | OUTPATIENT
Start: 2021-10-13 | End: 2021-10-13

## 2021-10-13 RX ORDER — LIDOCAINE HYDROCHLORIDE 10 MG/ML
INJECTION, SOLUTION EPIDURAL; INFILTRATION; INTRACAUDAL; PERINEURAL
Status: COMPLETED
Start: 2021-10-13 | End: 2021-10-13

## 2021-10-13 RX ORDER — POTASSIUM CHLORIDE 14.9 MG/ML
20 INJECTION INTRAVENOUS ONCE
Status: COMPLETED | OUTPATIENT
Start: 2021-10-13 | End: 2021-10-13

## 2021-10-13 RX ORDER — SODIUM CHLORIDE, SODIUM GLUCONATE, SODIUM ACETATE, POTASSIUM CHLORIDE, MAGNESIUM CHLORIDE, SODIUM PHOSPHATE, DIBASIC, AND POTASSIUM PHOSPHATE .53; .5; .37; .037; .03; .012; .00082 G/100ML; G/100ML; G/100ML; G/100ML; G/100ML; G/100ML; G/100ML
1000 INJECTION, SOLUTION INTRAVENOUS ONCE
Status: COMPLETED | OUTPATIENT
Start: 2021-10-13 | End: 2021-10-13

## 2021-10-13 RX ORDER — FENTANYL CITRATE 50 UG/ML
100 INJECTION, SOLUTION INTRAMUSCULAR; INTRAVENOUS ONCE
Status: COMPLETED | OUTPATIENT
Start: 2021-10-13 | End: 2021-10-13

## 2021-10-13 RX ORDER — AZITHROMYCIN 500 MG/1
500 TABLET, FILM COATED ORAL EVERY 24 HOURS
Status: COMPLETED | OUTPATIENT
Start: 2021-10-13 | End: 2021-10-15

## 2021-10-13 RX ORDER — CHLORHEXIDINE GLUCONATE 0.12 MG/ML
15 RINSE ORAL EVERY 12 HOURS SCHEDULED
Status: DISCONTINUED | OUTPATIENT
Start: 2021-10-13 | End: 2021-10-29 | Stop reason: HOSPADM

## 2021-10-13 RX ORDER — POTASSIUM CHLORIDE 29.8 MG/ML
40 INJECTION INTRAVENOUS ONCE
Status: COMPLETED | OUTPATIENT
Start: 2021-10-13 | End: 2021-10-13

## 2021-10-13 RX ORDER — ATORVASTATIN CALCIUM 80 MG/1
80 TABLET, FILM COATED ORAL
Status: DISCONTINUED | OUTPATIENT
Start: 2021-10-13 | End: 2021-10-15

## 2021-10-13 RX ORDER — VANCOMYCIN HYDROCHLORIDE 1 G/200ML
15 INJECTION, SOLUTION INTRAVENOUS EVERY 12 HOURS
Status: DISCONTINUED | OUTPATIENT
Start: 2021-10-13 | End: 2021-10-13

## 2021-10-13 RX ORDER — ACETAMINOPHEN 160 MG/5ML
650 SUSPENSION, ORAL (FINAL DOSE FORM) ORAL EVERY 6 HOURS PRN
Status: DISCONTINUED | OUTPATIENT
Start: 2021-10-13 | End: 2021-10-29 | Stop reason: HOSPADM

## 2021-10-13 RX ADMIN — ACETAMINOPHEN 650 MG: 650 SUSPENSION ORAL at 18:29

## 2021-10-13 RX ADMIN — ASPIRIN 81 MG: 81 TABLET, COATED ORAL at 08:45

## 2021-10-13 RX ADMIN — AZITHROMYCIN 500 MG: 500 TABLET, FILM COATED ORAL at 12:42

## 2021-10-13 RX ADMIN — NOREPINEPHRINE BITARTRATE 14 MCG/MIN: 1 INJECTION, SOLUTION, CONCENTRATE INTRAVENOUS at 21:48

## 2021-10-13 RX ADMIN — HYDROCORTISONE SODIUM SUCCINATE 50 MG: 100 INJECTION, POWDER, FOR SOLUTION INTRAMUSCULAR; INTRAVENOUS at 00:00

## 2021-10-13 RX ADMIN — PERFLUTREN 0.4 ML/MIN: 6.52 INJECTION, SUSPENSION INTRAVENOUS at 09:15

## 2021-10-13 RX ADMIN — HEPARIN SODIUM 5000 UNITS: 5000 INJECTION INTRAVENOUS; SUBCUTANEOUS at 05:57

## 2021-10-13 RX ADMIN — PANTOPRAZOLE SODIUM 40 MG: 40 INJECTION, POWDER, FOR SOLUTION INTRAVENOUS at 08:44

## 2021-10-13 RX ADMIN — HYDROCORTISONE SODIUM SUCCINATE 50 MG: 100 INJECTION, POWDER, FOR SOLUTION INTRAMUSCULAR; INTRAVENOUS at 23:39

## 2021-10-13 RX ADMIN — HYDROCORTISONE SODIUM SUCCINATE 50 MG: 100 INJECTION, POWDER, FOR SOLUTION INTRAMUSCULAR; INTRAVENOUS at 11:23

## 2021-10-13 RX ADMIN — CHLORHEXIDINE GLUCONATE 15 ML: 1.2 SOLUTION ORAL at 08:23

## 2021-10-13 RX ADMIN — CHLORHEXIDINE GLUCONATE 15 ML: 1.2 SOLUTION ORAL at 20:08

## 2021-10-13 RX ADMIN — Medication 80 MG: at 02:29

## 2021-10-13 RX ADMIN — HEPARIN SODIUM 5000 UNITS: 5000 INJECTION INTRAVENOUS; SUBCUTANEOUS at 13:21

## 2021-10-13 RX ADMIN — EPINEPHRINE 2 MCG/MIN: 1 INJECTION, SOLUTION, CONCENTRATE INTRAVENOUS at 05:45

## 2021-10-13 RX ADMIN — NOREPINEPHRINE BITARTRATE 22 MCG/MIN: 1 INJECTION, SOLUTION, CONCENTRATE INTRAVENOUS at 07:52

## 2021-10-13 RX ADMIN — NOREPINEPHRINE BITARTRATE 18 MCG/MIN: 1 INJECTION, SOLUTION, CONCENTRATE INTRAVENOUS at 05:01

## 2021-10-13 RX ADMIN — FENTANYL CITRATE 50 MCG/HR: 0.05 INJECTION, SOLUTION INTRAMUSCULAR; INTRAVENOUS at 02:55

## 2021-10-13 RX ADMIN — MIDAZOLAM HYDROCHLORIDE 2 MG: 1 INJECTION, SOLUTION INTRAMUSCULAR; INTRAVENOUS at 01:32

## 2021-10-13 RX ADMIN — HYDROCORTISONE SODIUM SUCCINATE 50 MG: 100 INJECTION, POWDER, FOR SOLUTION INTRAMUSCULAR; INTRAVENOUS at 17:49

## 2021-10-13 RX ADMIN — POTASSIUM CHLORIDE 40 MEQ: 29.8 INJECTION, SOLUTION INTRAVENOUS at 01:10

## 2021-10-13 RX ADMIN — CEFEPIME HYDROCHLORIDE 2000 MG: 2 INJECTION, POWDER, FOR SOLUTION INTRAVENOUS at 20:05

## 2021-10-13 RX ADMIN — CLOPIDOGREL BISULFATE 75 MG: 75 TABLET ORAL at 08:45

## 2021-10-13 RX ADMIN — DEXTROSE MONOHYDRATE 50 ML: 500 INJECTION PARENTERAL at 13:19

## 2021-10-13 RX ADMIN — HEPARIN SODIUM 5000 UNITS: 5000 INJECTION INTRAVENOUS; SUBCUTANEOUS at 21:49

## 2021-10-13 RX ADMIN — VANCOMYCIN HYDROCHLORIDE 1000 MG: 1 INJECTION, SOLUTION INTRAVENOUS at 10:50

## 2021-10-13 RX ADMIN — DEXTROSE, SODIUM CHLORIDE, SODIUM LACTATE, POTASSIUM CHLORIDE, AND CALCIUM CHLORIDE 250 ML/HR: 5; .6; .31; .03; .02 INJECTION, SOLUTION INTRAVENOUS at 08:48

## 2021-10-13 RX ADMIN — LIDOCAINE HYDROCHLORIDE 300 MG: 10 INJECTION, SOLUTION EPIDURAL; INFILTRATION; INTRACAUDAL; PERINEURAL at 11:45

## 2021-10-13 RX ADMIN — SODIUM CHLORIDE 26.4 UNITS/HR: 9 INJECTION, SOLUTION INTRAVENOUS at 05:51

## 2021-10-13 RX ADMIN — SODIUM CHLORIDE 26.4 UNITS/HR: 9 INJECTION, SOLUTION INTRAVENOUS at 08:42

## 2021-10-13 RX ADMIN — HYDROCORTISONE SODIUM SUCCINATE 50 MG: 100 INJECTION, POWDER, FOR SOLUTION INTRAMUSCULAR; INTRAVENOUS at 05:57

## 2021-10-13 RX ADMIN — SODIUM CHLORIDE, SODIUM GLUCONATE, SODIUM ACETATE, POTASSIUM CHLORIDE, MAGNESIUM CHLORIDE, SODIUM PHOSPHATE, DIBASIC, AND POTASSIUM PHOSPHATE 150 ML/HR: .53; .5; .37; .037; .03; .012; .00082 INJECTION, SOLUTION INTRAVENOUS at 12:17

## 2021-10-13 RX ADMIN — CHLORHEXIDINE GLUCONATE 15 ML: 1.2 SOLUTION ORAL at 03:17

## 2021-10-13 RX ADMIN — SODIUM CHLORIDE 0.5 UNITS/HR: 9 INJECTION, SOLUTION INTRAVENOUS at 12:15

## 2021-10-13 RX ADMIN — VASOPRESSIN 0.04 UNITS/MIN: 20 INJECTION INTRAVENOUS at 05:03

## 2021-10-13 RX ADMIN — CALCIUM GLUCONATE 2 G: 20 INJECTION, SOLUTION INTRAVENOUS at 05:53

## 2021-10-13 RX ADMIN — DEXTROSE MONOHYDRATE 50 ML: 25 INJECTION, SOLUTION INTRAVENOUS at 13:19

## 2021-10-13 RX ADMIN — DEXTROSE, SODIUM CHLORIDE, SODIUM LACTATE, POTASSIUM CHLORIDE, AND CALCIUM CHLORIDE 250 ML/HR: 5; .6; .31; .03; .02 INJECTION, SOLUTION INTRAVENOUS at 04:40

## 2021-10-13 RX ADMIN — NOREPINEPHRINE BITARTRATE 16 MCG/MIN: 1 INJECTION, SOLUTION, CONCENTRATE INTRAVENOUS at 11:20

## 2021-10-13 RX ADMIN — POTASSIUM PHOSPHATE, MONOBASIC AND POTASSIUM PHOSPHATE, DIBASIC 30 MMOL: 224; 236 INJECTION, SOLUTION, CONCENTRATE INTRAVENOUS at 10:41

## 2021-10-13 RX ADMIN — DEXTROSE, SODIUM CHLORIDE, SODIUM LACTATE, POTASSIUM CHLORIDE, AND CALCIUM CHLORIDE 250 ML/HR: 5; .6; .31; .03; .02 INJECTION, SOLUTION INTRAVENOUS at 04:37

## 2021-10-13 RX ADMIN — SODIUM CHLORIDE, SODIUM GLUCONATE, SODIUM ACETATE, POTASSIUM CHLORIDE, MAGNESIUM CHLORIDE, SODIUM PHOSPHATE, DIBASIC, AND POTASSIUM PHOSPHATE 250 ML/HR: .53; .5; .37; .037; .03; .012; .00082 INJECTION, SOLUTION INTRAVENOUS at 00:52

## 2021-10-13 RX ADMIN — NOREPINEPHRINE BITARTRATE 8 MCG/MIN: 1 INJECTION, SOLUTION, CONCENTRATE INTRAVENOUS at 01:23

## 2021-10-13 RX ADMIN — SODIUM CHLORIDE, SODIUM GLUCONATE, SODIUM ACETATE, POTASSIUM CHLORIDE, MAGNESIUM CHLORIDE, SODIUM PHOSPHATE, DIBASIC, AND POTASSIUM PHOSPHATE 1000 ML: .53; .5; .37; .037; .03; .012; .00082 INJECTION, SOLUTION INTRAVENOUS at 02:09

## 2021-10-13 RX ADMIN — SODIUM CHLORIDE, SODIUM GLUCONATE, SODIUM ACETATE, POTASSIUM CHLORIDE, MAGNESIUM CHLORIDE, SODIUM PHOSPHATE, DIBASIC, AND POTASSIUM PHOSPHATE 150 ML/HR: .53; .5; .37; .037; .03; .012; .00082 INJECTION, SOLUTION INTRAVENOUS at 22:02

## 2021-10-13 RX ADMIN — POTASSIUM CHLORIDE 20 MEQ: 14.9 INJECTION, SOLUTION INTRAVENOUS at 03:26

## 2021-10-13 RX ADMIN — POTASSIUM & SODIUM PHOSPHATES POWDER PACK 280-160-250 MG 2 PACKET: 280-160-250 PACK at 03:13

## 2021-10-13 RX ADMIN — NOREPINEPHRINE BITARTRATE 14 MCG/MIN: 1 INJECTION, SOLUTION, CONCENTRATE INTRAVENOUS at 15:18

## 2021-10-13 RX ADMIN — VASOPRESSIN 0.04 UNITS/MIN: 20 INJECTION INTRAVENOUS at 22:27

## 2021-10-13 RX ADMIN — ATORVASTATIN CALCIUM 80 MG: 80 TABLET, FILM COATED ORAL at 16:09

## 2021-10-13 RX ADMIN — FENTANYL CITRATE 100 MCG: 50 INJECTION INTRAMUSCULAR; INTRAVENOUS at 12:09

## 2021-10-13 RX ADMIN — VASOPRESSIN 0.04 UNITS/MIN: 20 INJECTION INTRAVENOUS at 14:07

## 2021-10-14 ENCOUNTER — APPOINTMENT (INPATIENT)
Dept: RADIOLOGY | Facility: HOSPITAL | Age: 76
DRG: 871 | End: 2021-10-14
Payer: MEDICARE

## 2021-10-14 LAB
ALBUMIN SERPL BCP-MCNC: 1.4 G/DL (ref 3.5–5)
ALP SERPL-CCNC: 68 U/L (ref 46–116)
ALT SERPL W P-5'-P-CCNC: 18 U/L (ref 12–78)
ANION GAP SERPL CALCULATED.3IONS-SCNC: 6 MMOL/L (ref 4–13)
ANION GAP SERPL CALCULATED.3IONS-SCNC: 8 MMOL/L (ref 4–13)
ANION GAP SERPL CALCULATED.3IONS-SCNC: 8 MMOL/L (ref 4–13)
AST SERPL W P-5'-P-CCNC: 39 U/L (ref 5–45)
BILIRUB DIRECT SERPL-MCNC: 0.43 MG/DL (ref 0–0.2)
BILIRUB SERPL-MCNC: 1.04 MG/DL (ref 0.2–1)
BUN SERPL-MCNC: 35 MG/DL (ref 5–25)
BUN SERPL-MCNC: 36 MG/DL (ref 5–25)
BUN SERPL-MCNC: 44 MG/DL (ref 5–25)
CA-I BLD-SCNC: 0.9 MMOL/L (ref 1.12–1.32)
CALCIUM SERPL-MCNC: 6.8 MG/DL (ref 8.3–10.1)
CALCIUM SERPL-MCNC: 7.6 MG/DL (ref 8.3–10.1)
CALCIUM SERPL-MCNC: 7.9 MG/DL (ref 8.3–10.1)
CHLORIDE SERPL-SCNC: 112 MMOL/L (ref 100–108)
CHLORIDE SERPL-SCNC: 113 MMOL/L (ref 100–108)
CHLORIDE SERPL-SCNC: 114 MMOL/L (ref 100–108)
CO2 SERPL-SCNC: 20 MMOL/L (ref 21–32)
CO2 SERPL-SCNC: 22 MMOL/L (ref 21–32)
CO2 SERPL-SCNC: 22 MMOL/L (ref 21–32)
CREAT SERPL-MCNC: 0.86 MG/DL (ref 0.6–1.3)
CREAT SERPL-MCNC: 0.87 MG/DL (ref 0.6–1.3)
CREAT SERPL-MCNC: 1.11 MG/DL (ref 0.6–1.3)
ERYTHROCYTE [DISTWIDTH] IN BLOOD BY AUTOMATED COUNT: 13.7 % (ref 11.6–15.1)
GFR SERPL CREATININE-BSD FRML MDRD: 65 ML/MIN/1.73SQ M
GFR SERPL CREATININE-BSD FRML MDRD: 84 ML/MIN/1.73SQ M
GFR SERPL CREATININE-BSD FRML MDRD: 85 ML/MIN/1.73SQ M
GLUCOSE SERPL-MCNC: 106 MG/DL (ref 65–140)
GLUCOSE SERPL-MCNC: 110 MG/DL (ref 65–140)
GLUCOSE SERPL-MCNC: 119 MG/DL (ref 65–140)
GLUCOSE SERPL-MCNC: 124 MG/DL (ref 65–140)
GLUCOSE SERPL-MCNC: 137 MG/DL (ref 65–140)
GLUCOSE SERPL-MCNC: 137 MG/DL (ref 65–140)
GLUCOSE SERPL-MCNC: 146 MG/DL (ref 65–140)
GLUCOSE SERPL-MCNC: 155 MG/DL (ref 65–140)
GLUCOSE SERPL-MCNC: 156 MG/DL (ref 65–140)
GLUCOSE SERPL-MCNC: 163 MG/DL (ref 65–140)
GLUCOSE SERPL-MCNC: 172 MG/DL (ref 65–140)
GLUCOSE SERPL-MCNC: 178 MG/DL (ref 65–140)
GLUCOSE SERPL-MCNC: 179 MG/DL (ref 65–140)
GLUCOSE SERPL-MCNC: 191 MG/DL (ref 65–140)
GLUCOSE SERPL-MCNC: 204 MG/DL (ref 65–140)
GLUCOSE SERPL-MCNC: 207 MG/DL (ref 65–140)
GLUCOSE SERPL-MCNC: 98 MG/DL (ref 65–140)
HCT VFR BLD AUTO: 26 % (ref 36.5–49.3)
HGB BLD-MCNC: 9.5 G/DL (ref 12–17)
MCH RBC QN AUTO: 31 PG (ref 26.8–34.3)
MCHC RBC AUTO-ENTMCNC: 36.5 G/DL (ref 31.4–37.4)
MCV RBC AUTO: 85 FL (ref 82–98)
MRSA NOSE QL CULT: NORMAL
NRBC BLD AUTO-RTO: 0 /100 WBCS
PLATELET # BLD AUTO: 77 THOUSANDS/UL (ref 149–390)
PMV BLD AUTO: 13.1 FL (ref 8.9–12.7)
POTASSIUM SERPL-SCNC: 2.9 MMOL/L (ref 3.5–5.3)
POTASSIUM SERPL-SCNC: 3 MMOL/L (ref 3.5–5.3)
POTASSIUM SERPL-SCNC: 4.1 MMOL/L (ref 3.5–5.3)
PROT SERPL-MCNC: 4.4 G/DL (ref 6.4–8.2)
RBC # BLD AUTO: 3.06 MILLION/UL (ref 3.88–5.62)
SODIUM SERPL-SCNC: 141 MMOL/L (ref 136–145)
SODIUM SERPL-SCNC: 142 MMOL/L (ref 136–145)
SODIUM SERPL-SCNC: 142 MMOL/L (ref 136–145)
VANCOMYCIN SERPL-MCNC: 10.1 UG/ML
WBC # BLD AUTO: 12.1 THOUSAND/UL (ref 4.31–10.16)

## 2021-10-14 PROCEDURE — 80048 BASIC METABOLIC PNL TOTAL CA: CPT | Performed by: INTERNAL MEDICINE

## 2021-10-14 PROCEDURE — 80048 BASIC METABOLIC PNL TOTAL CA: CPT

## 2021-10-14 PROCEDURE — 94760 N-INVAS EAR/PLS OXIMETRY 1: CPT

## 2021-10-14 PROCEDURE — 80202 ASSAY OF VANCOMYCIN: CPT | Performed by: INTERNAL MEDICINE

## 2021-10-14 PROCEDURE — 94640 AIRWAY INHALATION TREATMENT: CPT

## 2021-10-14 PROCEDURE — 99222 1ST HOSP IP/OBS MODERATE 55: CPT | Performed by: PODIATRIST

## 2021-10-14 PROCEDURE — 99291 CRITICAL CARE FIRST HOUR: CPT | Performed by: INTERNAL MEDICINE

## 2021-10-14 PROCEDURE — 82330 ASSAY OF CALCIUM: CPT | Performed by: FAMILY MEDICINE

## 2021-10-14 PROCEDURE — 82948 REAGENT STRIP/BLOOD GLUCOSE: CPT

## 2021-10-14 PROCEDURE — 85027 COMPLETE CBC AUTOMATED: CPT | Performed by: FAMILY MEDICINE

## 2021-10-14 PROCEDURE — C9113 INJ PANTOPRAZOLE SODIUM, VIA: HCPCS | Performed by: REGISTERED NURSE

## 2021-10-14 PROCEDURE — 80048 BASIC METABOLIC PNL TOTAL CA: CPT | Performed by: STUDENT IN AN ORGANIZED HEALTH CARE EDUCATION/TRAINING PROGRAM

## 2021-10-14 PROCEDURE — 80076 HEPATIC FUNCTION PANEL: CPT | Performed by: FAMILY MEDICINE

## 2021-10-14 PROCEDURE — 73650 X-RAY EXAM OF HEEL: CPT

## 2021-10-14 PROCEDURE — 94664 DEMO&/EVAL PT USE INHALER: CPT

## 2021-10-14 RX ORDER — FENTANYL CITRATE 50 UG/ML
25 INJECTION, SOLUTION INTRAMUSCULAR; INTRAVENOUS EVERY 2 HOUR PRN
Status: DISCONTINUED | OUTPATIENT
Start: 2021-10-14 | End: 2021-10-15

## 2021-10-14 RX ORDER — POTASSIUM CHLORIDE 29.8 MG/ML
40 INJECTION INTRAVENOUS ONCE
Status: COMPLETED | OUTPATIENT
Start: 2021-10-14 | End: 2021-10-14

## 2021-10-14 RX ORDER — ALBUMIN, HUMAN INJ 5% 5 %
12.5 SOLUTION INTRAVENOUS EVERY 6 HOURS
Status: COMPLETED | OUTPATIENT
Start: 2021-10-14 | End: 2021-10-15

## 2021-10-14 RX ORDER — POTASSIUM CHLORIDE 20 MEQ/1
40 TABLET, EXTENDED RELEASE ORAL ONCE
Status: DISCONTINUED | OUTPATIENT
Start: 2021-10-14 | End: 2021-10-14

## 2021-10-14 RX ORDER — CALCIUM GLUCONATE 20 MG/ML
1 INJECTION, SOLUTION INTRAVENOUS ONCE
Status: COMPLETED | OUTPATIENT
Start: 2021-10-14 | End: 2021-10-14

## 2021-10-14 RX ORDER — LEVALBUTEROL 1.25 MG/.5ML
1.25 SOLUTION, CONCENTRATE RESPIRATORY (INHALATION)
Status: DISCONTINUED | OUTPATIENT
Start: 2021-10-14 | End: 2021-10-21

## 2021-10-14 RX ORDER — ALBUMIN, HUMAN INJ 5% 5 %
25 SOLUTION INTRAVENOUS ONCE
Status: COMPLETED | OUTPATIENT
Start: 2021-10-14 | End: 2021-10-14

## 2021-10-14 RX ORDER — CALCIUM GLUCONATE 20 MG/ML
2 INJECTION, SOLUTION INTRAVENOUS ONCE
Status: COMPLETED | OUTPATIENT
Start: 2021-10-14 | End: 2021-10-14

## 2021-10-14 RX ORDER — POTASSIUM CHLORIDE 29.8 MG/ML
40 INJECTION INTRAVENOUS ONCE
Status: DISCONTINUED | OUTPATIENT
Start: 2021-10-14 | End: 2021-10-14

## 2021-10-14 RX ORDER — POTASSIUM CHLORIDE 20MEQ/15ML
40 LIQUID (ML) ORAL ONCE
Status: COMPLETED | OUTPATIENT
Start: 2021-10-14 | End: 2021-10-14

## 2021-10-14 RX ORDER — ASPIRIN 81 MG/1
81 TABLET, CHEWABLE ORAL DAILY
Status: DISCONTINUED | OUTPATIENT
Start: 2021-10-15 | End: 2021-10-15

## 2021-10-14 RX ORDER — IPRATROPIUM BROMIDE AND ALBUTEROL SULFATE 2.5; .5 MG/3ML; MG/3ML
3 SOLUTION RESPIRATORY (INHALATION)
Status: DISCONTINUED | OUTPATIENT
Start: 2021-10-14 | End: 2021-10-14

## 2021-10-14 RX ORDER — SODIUM CHLORIDE, SODIUM GLUCONATE, SODIUM ACETATE, POTASSIUM CHLORIDE, MAGNESIUM CHLORIDE, SODIUM PHOSPHATE, DIBASIC, AND POTASSIUM PHOSPHATE .53; .5; .37; .037; .03; .012; .00082 G/100ML; G/100ML; G/100ML; G/100ML; G/100ML; G/100ML; G/100ML
100 INJECTION, SOLUTION INTRAVENOUS CONTINUOUS
Status: DISCONTINUED | OUTPATIENT
Start: 2021-10-14 | End: 2021-10-15

## 2021-10-14 RX ADMIN — VASOPRESSIN 0.04 UNITS/MIN: 20 INJECTION INTRAVENOUS at 06:21

## 2021-10-14 RX ADMIN — ALBUMIN (HUMAN) 25 G: 12.5 INJECTION, SOLUTION INTRAVENOUS at 13:30

## 2021-10-14 RX ADMIN — SODIUM CHLORIDE, SODIUM GLUCONATE, SODIUM ACETATE, POTASSIUM CHLORIDE, MAGNESIUM CHLORIDE, SODIUM PHOSPHATE, DIBASIC, AND POTASSIUM PHOSPHATE 100 ML/HR: .53; .5; .37; .037; .03; .012; .00082 INJECTION, SOLUTION INTRAVENOUS at 21:15

## 2021-10-14 RX ADMIN — CALCIUM GLUCONATE 1 G: 20 INJECTION, SOLUTION INTRAVENOUS at 11:23

## 2021-10-14 RX ADMIN — VANCOMYCIN HYDROCHLORIDE 1500 MG: 10 INJECTION, POWDER, LYOPHILIZED, FOR SOLUTION INTRAVENOUS at 09:24

## 2021-10-14 RX ADMIN — HYDROCORTISONE SODIUM SUCCINATE 50 MG: 100 INJECTION, POWDER, FOR SOLUTION INTRAMUSCULAR; INTRAVENOUS at 13:30

## 2021-10-14 RX ADMIN — HEPARIN SODIUM 5000 UNITS: 5000 INJECTION INTRAVENOUS; SUBCUTANEOUS at 13:30

## 2021-10-14 RX ADMIN — LEVALBUTEROL HYDROCHLORIDE 1.25 MG: 1.25 SOLUTION, CONCENTRATE RESPIRATORY (INHALATION) at 19:04

## 2021-10-14 RX ADMIN — HYDROCORTISONE SODIUM SUCCINATE 50 MG: 100 INJECTION, POWDER, FOR SOLUTION INTRAMUSCULAR; INTRAVENOUS at 05:16

## 2021-10-14 RX ADMIN — ACETAMINOPHEN 650 MG: 650 SUSPENSION ORAL at 00:29

## 2021-10-14 RX ADMIN — HYDROCORTISONE SODIUM SUCCINATE 50 MG: 100 INJECTION, POWDER, FOR SOLUTION INTRAMUSCULAR; INTRAVENOUS at 22:29

## 2021-10-14 RX ADMIN — HEPARIN SODIUM 5000 UNITS: 5000 INJECTION INTRAVENOUS; SUBCUTANEOUS at 05:19

## 2021-10-14 RX ADMIN — POTASSIUM CHLORIDE 40 MEQ: 20 SOLUTION ORAL at 10:23

## 2021-10-14 RX ADMIN — ATORVASTATIN CALCIUM 80 MG: 80 TABLET, FILM COATED ORAL at 15:57

## 2021-10-14 RX ADMIN — SODIUM CHLORIDE, SODIUM GLUCONATE, SODIUM ACETATE, POTASSIUM CHLORIDE, MAGNESIUM CHLORIDE, SODIUM PHOSPHATE, DIBASIC, AND POTASSIUM PHOSPHATE 150 ML/HR: .53; .5; .37; .037; .03; .012; .00082 INJECTION, SOLUTION INTRAVENOUS at 05:17

## 2021-10-14 RX ADMIN — POTASSIUM CHLORIDE 40 MEQ: 20 SOLUTION ORAL at 15:58

## 2021-10-14 RX ADMIN — CLOPIDOGREL BISULFATE 75 MG: 75 TABLET ORAL at 08:34

## 2021-10-14 RX ADMIN — IPRATROPIUM BROMIDE 0.5 MG: 0.5 SOLUTION RESPIRATORY (INHALATION) at 19:04

## 2021-10-14 RX ADMIN — ALBUMIN (HUMAN) 12.5 G: 12.5 INJECTION, SOLUTION INTRAVENOUS at 20:10

## 2021-10-14 RX ADMIN — IPRATROPIUM BROMIDE 0.5 MG: 0.5 SOLUTION RESPIRATORY (INHALATION) at 13:14

## 2021-10-14 RX ADMIN — SODIUM CHLORIDE 1.5 UNITS/HR: 9 INJECTION, SOLUTION INTRAVENOUS at 09:25

## 2021-10-14 RX ADMIN — ALBUMIN (HUMAN) 12.5 G: 12.5 INJECTION, SOLUTION INTRAVENOUS at 12:52

## 2021-10-14 RX ADMIN — ALBUMIN (HUMAN) 12.5 G: 12.5 INJECTION, SOLUTION INTRAVENOUS at 08:30

## 2021-10-14 RX ADMIN — VASOPRESSIN 0.04 UNITS/MIN: 20 INJECTION INTRAVENOUS at 16:54

## 2021-10-14 RX ADMIN — CHLORHEXIDINE GLUCONATE 15 ML: 1.2 SOLUTION ORAL at 08:34

## 2021-10-14 RX ADMIN — POTASSIUM CHLORIDE 40 MEQ: 29.8 INJECTION, SOLUTION INTRAVENOUS at 08:02

## 2021-10-14 RX ADMIN — CHLORHEXIDINE GLUCONATE 15 ML: 1.2 SOLUTION ORAL at 20:10

## 2021-10-14 RX ADMIN — CEFEPIME HYDROCHLORIDE 2000 MG: 2 INJECTION, POWDER, FOR SOLUTION INTRAVENOUS at 08:34

## 2021-10-14 RX ADMIN — HEPARIN SODIUM 5000 UNITS: 5000 INJECTION INTRAVENOUS; SUBCUTANEOUS at 22:29

## 2021-10-14 RX ADMIN — NOREPINEPHRINE BITARTRATE 10 MCG/MIN: 1 INJECTION, SOLUTION, CONCENTRATE INTRAVENOUS at 12:43

## 2021-10-14 RX ADMIN — POTASSIUM CHLORIDE 40 MEQ: 29.8 INJECTION, SOLUTION INTRAVENOUS at 15:59

## 2021-10-14 RX ADMIN — NOREPINEPHRINE BITARTRATE 10 MCG/MIN: 1 INJECTION, SOLUTION, CONCENTRATE INTRAVENOUS at 03:42

## 2021-10-14 RX ADMIN — LEVOTHYROXINE SODIUM 150 MCG: 100 TABLET ORAL at 05:21

## 2021-10-14 RX ADMIN — CALCIUM GLUCONATE 2 G: 20 INJECTION, SOLUTION INTRAVENOUS at 13:50

## 2021-10-14 RX ADMIN — CEFEPIME HYDROCHLORIDE 2000 MG: 2 INJECTION, POWDER, FOR SOLUTION INTRAVENOUS at 20:14

## 2021-10-14 RX ADMIN — AZITHROMYCIN 500 MG: 500 TABLET, FILM COATED ORAL at 12:28

## 2021-10-14 RX ADMIN — PANTOPRAZOLE SODIUM 40 MG: 40 INJECTION, POWDER, FOR SOLUTION INTRAVENOUS at 08:34

## 2021-10-14 RX ADMIN — ASPIRIN 81 MG: 81 TABLET, COATED ORAL at 08:34

## 2021-10-14 RX ADMIN — LEVALBUTEROL HYDROCHLORIDE 1.25 MG: 1.25 SOLUTION, CONCENTRATE RESPIRATORY (INHALATION) at 13:14

## 2021-10-15 ENCOUNTER — APPOINTMENT (INPATIENT)
Dept: RADIOLOGY | Facility: HOSPITAL | Age: 76
DRG: 871 | End: 2021-10-15
Payer: MEDICARE

## 2021-10-15 PROBLEM — I10 HTN (HYPERTENSION): Status: ACTIVE | Noted: 2021-10-15

## 2021-10-15 PROBLEM — F03.90 DEMENTIA (HCC): Status: ACTIVE | Noted: 2021-10-15

## 2021-10-15 PROBLEM — L89.619 PRESSURE ULCER OF RIGHT HEEL: Status: ACTIVE | Noted: 2021-10-15

## 2021-10-15 PROBLEM — J43.2 CENTRILOBULAR EMPHYSEMA (HCC): Status: ACTIVE | Noted: 2021-10-15

## 2021-10-15 PROBLEM — R32 URINARY INCONTINENCE: Status: ACTIVE | Noted: 2021-10-15

## 2021-10-15 PROBLEM — R77.8 ELEVATED TROPONIN: Status: ACTIVE | Noted: 2021-10-15

## 2021-10-15 PROBLEM — E03.9 HYPOTHYROIDISM: Status: ACTIVE | Noted: 2021-10-15

## 2021-10-15 PROBLEM — L98.429 SACRAL ULCER (HCC): Status: ACTIVE | Noted: 2021-10-15

## 2021-10-15 PROBLEM — G93.41 ACUTE METABOLIC ENCEPHALOPATHY: Status: ACTIVE | Noted: 2021-10-15

## 2021-10-15 PROBLEM — R13.10 DYSPHAGIA: Status: ACTIVE | Noted: 2021-10-15

## 2021-10-15 LAB
ANION GAP SERPL CALCULATED.3IONS-SCNC: 8 MMOL/L (ref 4–13)
BACTERIA SPT RESP CULT: ABNORMAL
BACTERIA SPT RESP CULT: ABNORMAL
BASE EXCESS BLDA CALC-SCNC: -2.7 MMOL/L
BODY TEMPERATURE: 99 DEGREES FEHRENHEIT
BUN SERPL-MCNC: 35 MG/DL (ref 5–25)
CALCIUM SERPL-MCNC: 7.6 MG/DL (ref 8.3–10.1)
CHLORIDE SERPL-SCNC: 114 MMOL/L (ref 100–108)
CO2 SERPL-SCNC: 20 MMOL/L (ref 21–32)
CREAT SERPL-MCNC: 0.84 MG/DL (ref 0.6–1.3)
ERYTHROCYTE [DISTWIDTH] IN BLOOD BY AUTOMATED COUNT: 14.7 % (ref 11.6–15.1)
ERYTHROCYTE [DISTWIDTH] IN BLOOD BY AUTOMATED COUNT: 15.2 % (ref 11.6–15.1)
GFR SERPL CREATININE-BSD FRML MDRD: 86 ML/MIN/1.73SQ M
GLUCOSE SERPL-MCNC: 148 MG/DL (ref 65–140)
GLUCOSE SERPL-MCNC: 150 MG/DL (ref 65–140)
GLUCOSE SERPL-MCNC: 150 MG/DL (ref 65–140)
GLUCOSE SERPL-MCNC: 153 MG/DL (ref 65–140)
GLUCOSE SERPL-MCNC: 155 MG/DL (ref 65–140)
GLUCOSE SERPL-MCNC: 158 MG/DL (ref 65–140)
GLUCOSE SERPL-MCNC: 197 MG/DL (ref 65–140)
GLUCOSE SERPL-MCNC: 251 MG/DL (ref 65–140)
GRAM STN SPEC: ABNORMAL
GRAM STN SPEC: ABNORMAL
HCO3 BLDA-SCNC: 21.3 MMOL/L (ref 22–28)
HCT VFR BLD AUTO: 22.1 % (ref 36.5–49.3)
HCT VFR BLD AUTO: 24 % (ref 36.5–49.3)
HGB BLD-MCNC: 7.9 G/DL (ref 12–17)
HGB BLD-MCNC: 8.1 G/DL (ref 12–17)
MCH RBC QN AUTO: 31.2 PG (ref 26.8–34.3)
MCH RBC QN AUTO: 31.5 PG (ref 26.8–34.3)
MCHC RBC AUTO-ENTMCNC: 33.8 G/DL (ref 31.4–37.4)
MCHC RBC AUTO-ENTMCNC: 35.7 G/DL (ref 31.4–37.4)
MCV RBC AUTO: 88 FL (ref 82–98)
MCV RBC AUTO: 92 FL (ref 82–98)
O2 CT BLDA-SCNC: 12.1 ML/DL (ref 16–23)
OXYHGB MFR BLDA: 95.8 % (ref 94–97)
P JIROVECII AG SPEC QL IF: NORMAL
PCO2 BLDA: 33.7 MM HG (ref 36–44)
PCO2 TEMP ADJ BLDA: 34 MM HG (ref 36–44)
PH BLD: 7.42 [PH] (ref 7.35–7.45)
PH BLDA: 7.42 [PH] (ref 7.35–7.45)
PLATELET # BLD AUTO: 59 THOUSANDS/UL (ref 149–390)
PLATELET # BLD AUTO: 63 THOUSANDS/UL (ref 149–390)
PMV BLD AUTO: 13.5 FL (ref 8.9–12.7)
PMV BLD AUTO: 13.8 FL (ref 8.9–12.7)
PO2 BLD: 85.6 MM HG (ref 75–129)
PO2 BLDA: 84.5 MM HG (ref 75–129)
POTASSIUM SERPL-SCNC: 3.9 MMOL/L (ref 3.5–5.3)
RBC # BLD AUTO: 2.51 MILLION/UL (ref 3.88–5.62)
RBC # BLD AUTO: 2.6 MILLION/UL (ref 3.88–5.62)
SODIUM SERPL-SCNC: 142 MMOL/L (ref 136–145)
SPECIMEN SOURCE: ABNORMAL
WBC # BLD AUTO: 7.44 THOUSAND/UL (ref 4.31–10.16)
WBC # BLD AUTO: 7.96 THOUSAND/UL (ref 4.31–10.16)

## 2021-10-15 PROCEDURE — 80048 BASIC METABOLIC PNL TOTAL CA: CPT | Performed by: STUDENT IN AN ORGANIZED HEALTH CARE EDUCATION/TRAINING PROGRAM

## 2021-10-15 PROCEDURE — 94760 N-INVAS EAR/PLS OXIMETRY 1: CPT

## 2021-10-15 PROCEDURE — 85027 COMPLETE CBC AUTOMATED: CPT | Performed by: FAMILY MEDICINE

## 2021-10-15 PROCEDURE — 94002 VENT MGMT INPAT INIT DAY: CPT

## 2021-10-15 PROCEDURE — 82805 BLOOD GASES W/O2 SATURATION: CPT | Performed by: INTERNAL MEDICINE

## 2021-10-15 PROCEDURE — 94669 MECHANICAL CHEST WALL OSCILL: CPT

## 2021-10-15 PROCEDURE — 94664 DEMO&/EVAL PT USE INHALER: CPT

## 2021-10-15 PROCEDURE — 82948 REAGENT STRIP/BLOOD GLUCOSE: CPT

## 2021-10-15 PROCEDURE — 94640 AIRWAY INHALATION TREATMENT: CPT

## 2021-10-15 PROCEDURE — 71045 X-RAY EXAM CHEST 1 VIEW: CPT

## 2021-10-15 PROCEDURE — 92610 EVALUATE SWALLOWING FUNCTION: CPT

## 2021-10-15 PROCEDURE — 94668 MNPJ CHEST WALL SBSQ: CPT

## 2021-10-15 PROCEDURE — 99291 CRITICAL CARE FIRST HOUR: CPT | Performed by: INTERNAL MEDICINE

## 2021-10-15 RX ORDER — ASPIRIN 300 MG/1
300 SUPPOSITORY RECTAL DAILY
Status: DISCONTINUED | OUTPATIENT
Start: 2021-10-16 | End: 2021-10-16

## 2021-10-15 RX ORDER — INSULIN GLARGINE 100 [IU]/ML
10 INJECTION, SOLUTION SUBCUTANEOUS
Status: DISCONTINUED | OUTPATIENT
Start: 2021-10-15 | End: 2021-10-15

## 2021-10-15 RX ORDER — FUROSEMIDE 10 MG/ML
40 INJECTION INTRAMUSCULAR; INTRAVENOUS ONCE
Status: DISCONTINUED | OUTPATIENT
Start: 2021-10-15 | End: 2021-10-15

## 2021-10-15 RX ORDER — FUROSEMIDE 10 MG/ML
40 INJECTION INTRAMUSCULAR; INTRAVENOUS ONCE
Status: COMPLETED | OUTPATIENT
Start: 2021-10-15 | End: 2021-10-15

## 2021-10-15 RX ORDER — INSULIN GLARGINE 100 [IU]/ML
10 INJECTION, SOLUTION SUBCUTANEOUS EVERY MORNING
Status: DISCONTINUED | OUTPATIENT
Start: 2021-10-15 | End: 2021-10-16

## 2021-10-15 RX ORDER — ALBUMIN, HUMAN INJ 5% 5 %
12.5 SOLUTION INTRAVENOUS ONCE
Status: COMPLETED | OUTPATIENT
Start: 2021-10-15 | End: 2021-10-16

## 2021-10-15 RX ORDER — FUROSEMIDE 10 MG/ML
40 INJECTION INTRAMUSCULAR; INTRAVENOUS ONCE
Status: CANCELLED | OUTPATIENT
Start: 2021-10-15

## 2021-10-15 RX ORDER — ACETYLCYSTEINE 200 MG/ML
3 SOLUTION ORAL; RESPIRATORY (INHALATION)
Status: DISCONTINUED | OUTPATIENT
Start: 2021-10-15 | End: 2021-10-15

## 2021-10-15 RX ORDER — ACETYLCYSTEINE 200 MG/ML
3 SOLUTION ORAL; RESPIRATORY (INHALATION)
Status: COMPLETED | OUTPATIENT
Start: 2021-10-15 | End: 2021-10-16

## 2021-10-15 RX ADMIN — INSULIN LISPRO 2 UNITS: 100 INJECTION, SOLUTION INTRAVENOUS; SUBCUTANEOUS at 12:02

## 2021-10-15 RX ADMIN — LEVALBUTEROL HYDROCHLORIDE 1.25 MG: 1.25 SOLUTION, CONCENTRATE RESPIRATORY (INHALATION) at 08:31

## 2021-10-15 RX ADMIN — LEVALBUTEROL HYDROCHLORIDE 1.25 MG: 1.25 SOLUTION, CONCENTRATE RESPIRATORY (INHALATION) at 22:39

## 2021-10-15 RX ADMIN — HEPARIN SODIUM 5000 UNITS: 5000 INJECTION INTRAVENOUS; SUBCUTANEOUS at 06:38

## 2021-10-15 RX ADMIN — INSULIN LISPRO 6 UNITS: 100 INJECTION, SOLUTION INTRAVENOUS; SUBCUTANEOUS at 17:54

## 2021-10-15 RX ADMIN — ALBUMIN (HUMAN) 12.5 G: 12.5 INJECTION, SOLUTION INTRAVENOUS at 02:22

## 2021-10-15 RX ADMIN — LEVOTHYROXINE SODIUM 150 MCG: 100 TABLET ORAL at 08:57

## 2021-10-15 RX ADMIN — CHLORHEXIDINE GLUCONATE 15 ML: 1.2 SOLUTION ORAL at 22:21

## 2021-10-15 RX ADMIN — HEPARIN SODIUM 5000 UNITS: 5000 INJECTION INTRAVENOUS; SUBCUTANEOUS at 14:28

## 2021-10-15 RX ADMIN — ACETYLCYSTEINE 600 MG: 200 SOLUTION ORAL; RESPIRATORY (INHALATION) at 13:15

## 2021-10-15 RX ADMIN — CEFEPIME HYDROCHLORIDE 2000 MG: 2 INJECTION, POWDER, FOR SOLUTION INTRAVENOUS at 08:49

## 2021-10-15 RX ADMIN — CEFTRIAXONE 2000 MG: 2 INJECTION, POWDER, FOR SOLUTION INTRAMUSCULAR; INTRAVENOUS at 16:01

## 2021-10-15 RX ADMIN — AZITHROMYCIN 500 MG: 500 TABLET, FILM COATED ORAL at 12:03

## 2021-10-15 RX ADMIN — IPRATROPIUM BROMIDE 0.5 MG: 0.5 SOLUTION RESPIRATORY (INHALATION) at 08:31

## 2021-10-15 RX ADMIN — ASPIRIN 81 MG CHEWABLE TABLET 81 MG: 81 TABLET CHEWABLE at 08:44

## 2021-10-15 RX ADMIN — HYDROCORTISONE SODIUM SUCCINATE 50 MG: 100 INJECTION, POWDER, FOR SOLUTION INTRAMUSCULAR; INTRAVENOUS at 06:39

## 2021-10-15 RX ADMIN — IPRATROPIUM BROMIDE 0.5 MG: 0.5 SOLUTION RESPIRATORY (INHALATION) at 22:39

## 2021-10-15 RX ADMIN — LEVALBUTEROL HYDROCHLORIDE 1.25 MG: 1.25 SOLUTION, CONCENTRATE RESPIRATORY (INHALATION) at 13:15

## 2021-10-15 RX ADMIN — FUROSEMIDE 40 MG: 10 INJECTION, SOLUTION INTRAMUSCULAR; INTRAVENOUS at 08:50

## 2021-10-15 RX ADMIN — HEPARIN SODIUM 5000 UNITS: 5000 INJECTION INTRAVENOUS; SUBCUTANEOUS at 22:23

## 2021-10-15 RX ADMIN — IPRATROPIUM BROMIDE 0.5 MG: 0.5 SOLUTION RESPIRATORY (INHALATION) at 13:15

## 2021-10-15 RX ADMIN — VASOPRESSIN 0.04 UNITS/MIN: 20 INJECTION INTRAVENOUS at 01:47

## 2021-10-15 RX ADMIN — CHLORHEXIDINE GLUCONATE 15 ML: 1.2 SOLUTION ORAL at 08:45

## 2021-10-15 RX ADMIN — HYDROCORTISONE SODIUM SUCCINATE 50 MG: 100 INJECTION, POWDER, FOR SOLUTION INTRAMUSCULAR; INTRAVENOUS at 22:25

## 2021-10-15 RX ADMIN — Medication 20 MG: at 08:47

## 2021-10-15 RX ADMIN — INSULIN GLARGINE 10 UNITS: 100 INJECTION, SOLUTION SUBCUTANEOUS at 11:02

## 2021-10-15 RX ADMIN — ACETYLCYSTEINE 3 ML: 200 SOLUTION ORAL; RESPIRATORY (INHALATION) at 22:39

## 2021-10-15 RX ADMIN — NOREPINEPHRINE BITARTRATE 5 MCG/MIN: 1 INJECTION, SOLUTION, CONCENTRATE INTRAVENOUS at 01:47

## 2021-10-15 RX ADMIN — ACETYLCYSTEINE 600 MG: 200 SOLUTION ORAL; RESPIRATORY (INHALATION) at 08:31

## 2021-10-15 RX ADMIN — ALBUMIN (HUMAN) 12.5 G: 12.5 INJECTION, SOLUTION INTRAVENOUS at 18:57

## 2021-10-15 RX ADMIN — CLOPIDOGREL BISULFATE 75 MG: 75 TABLET ORAL at 08:45

## 2021-10-16 LAB
ANION GAP SERPL CALCULATED.3IONS-SCNC: 9 MMOL/L (ref 4–13)
ARTIFACT: PRESENT
BACTERIA BRONCH AEROBE CULT: ABNORMAL
BASOPHILS # BLD MANUAL: 0 THOUSAND/UL (ref 0–0.1)
BASOPHILS NFR MAR MANUAL: 0 % (ref 0–1)
BUN SERPL-MCNC: 35 MG/DL (ref 5–25)
CA-I BLD-SCNC: 1.03 MMOL/L (ref 1.12–1.32)
CALCIUM SERPL-MCNC: 7.9 MG/DL (ref 8.3–10.1)
CHLORIDE SERPL-SCNC: 112 MMOL/L (ref 100–108)
CO2 SERPL-SCNC: 23 MMOL/L (ref 21–32)
CREAT SERPL-MCNC: 1.12 MG/DL (ref 0.6–1.3)
EOSINOPHIL # BLD MANUAL: 0 THOUSAND/UL (ref 0–0.4)
EOSINOPHIL NFR BLD MANUAL: 0 % (ref 0–6)
ERYTHROCYTE [DISTWIDTH] IN BLOOD BY AUTOMATED COUNT: 15.4 % (ref 11.6–15.1)
GFR SERPL CREATININE-BSD FRML MDRD: 64 ML/MIN/1.73SQ M
GLUCOSE SERPL-MCNC: 190 MG/DL (ref 65–140)
GLUCOSE SERPL-MCNC: 205 MG/DL (ref 65–140)
GLUCOSE SERPL-MCNC: 206 MG/DL (ref 65–140)
GLUCOSE SERPL-MCNC: 236 MG/DL (ref 65–140)
GLUCOSE SERPL-MCNC: 245 MG/DL (ref 65–140)
GRAM STN SPEC: ABNORMAL
HCT VFR BLD AUTO: 23.9 % (ref 36.5–49.3)
HGB BLD-MCNC: 8 G/DL (ref 12–17)
LYMPHOCYTES # BLD AUTO: 0.27 THOUSAND/UL (ref 0.6–4.47)
LYMPHOCYTES # BLD AUTO: 4 % (ref 14–44)
MAGNESIUM SERPL-MCNC: 2.7 MG/DL (ref 1.6–2.6)
MCH RBC QN AUTO: 30.7 PG (ref 26.8–34.3)
MCHC RBC AUTO-ENTMCNC: 33.5 G/DL (ref 31.4–37.4)
MCV RBC AUTO: 92 FL (ref 82–98)
MONOCYTES # BLD AUTO: 0.27 THOUSAND/UL (ref 0–1.22)
MONOCYTES NFR BLD: 4 % (ref 4–12)
NEUTROPHILS # BLD MANUAL: 6.14 THOUSAND/UL (ref 1.85–7.62)
NEUTS BAND NFR BLD MANUAL: 3 % (ref 0–8)
NEUTS SEG NFR BLD AUTO: 89 % (ref 43–75)
NRBC BLD AUTO-RTO: 3 /100 WBC (ref 0–2)
PHOSPHATE SERPL-MCNC: 2.3 MG/DL (ref 2.3–4.1)
PLATELET # BLD AUTO: 72 THOUSANDS/UL (ref 149–390)
PLATELET BLD QL SMEAR: ABNORMAL
PMV BLD AUTO: 12.8 FL (ref 8.9–12.7)
POIKILOCYTOSIS BLD QL SMEAR: PRESENT
POTASSIUM SERPL-SCNC: 3.3 MMOL/L (ref 3.5–5.3)
RBC # BLD AUTO: 2.61 MILLION/UL (ref 3.88–5.62)
RBC MORPH BLD: PRESENT
SODIUM SERPL-SCNC: 144 MMOL/L (ref 136–145)
WBC # BLD AUTO: 6.67 THOUSAND/UL (ref 4.31–10.16)

## 2021-10-16 PROCEDURE — NC001 PR NO CHARGE: Performed by: INTERNAL MEDICINE

## 2021-10-16 PROCEDURE — 94669 MECHANICAL CHEST WALL OSCILL: CPT

## 2021-10-16 PROCEDURE — 94640 AIRWAY INHALATION TREATMENT: CPT

## 2021-10-16 PROCEDURE — 82948 REAGENT STRIP/BLOOD GLUCOSE: CPT

## 2021-10-16 PROCEDURE — 80048 BASIC METABOLIC PNL TOTAL CA: CPT | Performed by: FAMILY MEDICINE

## 2021-10-16 PROCEDURE — C9113 INJ PANTOPRAZOLE SODIUM, VIA: HCPCS | Performed by: STUDENT IN AN ORGANIZED HEALTH CARE EDUCATION/TRAINING PROGRAM

## 2021-10-16 PROCEDURE — 84100 ASSAY OF PHOSPHORUS: CPT | Performed by: FAMILY MEDICINE

## 2021-10-16 PROCEDURE — 85027 COMPLETE CBC AUTOMATED: CPT | Performed by: FAMILY MEDICINE

## 2021-10-16 PROCEDURE — 82330 ASSAY OF CALCIUM: CPT | Performed by: FAMILY MEDICINE

## 2021-10-16 PROCEDURE — 94668 MNPJ CHEST WALL SBSQ: CPT

## 2021-10-16 PROCEDURE — 85007 BL SMEAR W/DIFF WBC COUNT: CPT | Performed by: FAMILY MEDICINE

## 2021-10-16 PROCEDURE — 99233 SBSQ HOSP IP/OBS HIGH 50: CPT | Performed by: INTERNAL MEDICINE

## 2021-10-16 PROCEDURE — 94760 N-INVAS EAR/PLS OXIMETRY 1: CPT

## 2021-10-16 PROCEDURE — 83735 ASSAY OF MAGNESIUM: CPT | Performed by: FAMILY MEDICINE

## 2021-10-16 PROCEDURE — 92526 ORAL FUNCTION THERAPY: CPT

## 2021-10-16 RX ORDER — POTASSIUM CHLORIDE 29.8 MG/ML
40 INJECTION INTRAVENOUS ONCE
Status: COMPLETED | OUTPATIENT
Start: 2021-10-16 | End: 2021-10-16

## 2021-10-16 RX ORDER — FUROSEMIDE 10 MG/ML
20 INJECTION INTRAMUSCULAR; INTRAVENOUS ONCE
Status: DISCONTINUED | OUTPATIENT
Start: 2021-10-16 | End: 2021-10-17

## 2021-10-16 RX ORDER — LANOLIN ALCOHOL/MO/W.PET/CERES
3 CREAM (GRAM) TOPICAL
Status: DISCONTINUED | OUTPATIENT
Start: 2021-10-16 | End: 2021-10-29 | Stop reason: HOSPADM

## 2021-10-16 RX ORDER — METOPROLOL TARTRATE 5 MG/5ML
2.5 INJECTION INTRAVENOUS EVERY 6 HOURS
Status: DISCONTINUED | OUTPATIENT
Start: 2021-10-16 | End: 2021-10-17

## 2021-10-16 RX ORDER — LEVOTHYROXINE SODIUM 0.07 MG/1
150 TABLET ORAL
Status: DISCONTINUED | OUTPATIENT
Start: 2021-10-17 | End: 2021-10-29 | Stop reason: HOSPADM

## 2021-10-16 RX ORDER — CLOPIDOGREL BISULFATE 75 MG/1
75 TABLET ORAL DAILY
Status: DISCONTINUED | OUTPATIENT
Start: 2021-10-16 | End: 2021-10-29 | Stop reason: HOSPADM

## 2021-10-16 RX ORDER — ASPIRIN 81 MG/1
81 TABLET, CHEWABLE ORAL DAILY
Status: DISCONTINUED | OUTPATIENT
Start: 2021-10-16 | End: 2021-10-29 | Stop reason: HOSPADM

## 2021-10-16 RX ORDER — INSULIN GLARGINE 100 [IU]/ML
14 INJECTION, SOLUTION SUBCUTANEOUS EVERY MORNING
Status: DISCONTINUED | OUTPATIENT
Start: 2021-10-16 | End: 2021-10-29

## 2021-10-16 RX ORDER — ATORVASTATIN CALCIUM 80 MG/1
80 TABLET, FILM COATED ORAL
Status: DISCONTINUED | OUTPATIENT
Start: 2021-10-16 | End: 2021-10-29 | Stop reason: HOSPADM

## 2021-10-16 RX ORDER — PANTOPRAZOLE SODIUM 40 MG/1
20 INJECTION, POWDER, FOR SOLUTION INTRAVENOUS
Status: DISCONTINUED | OUTPATIENT
Start: 2021-10-16 | End: 2021-10-21

## 2021-10-16 RX ADMIN — ACETYLCYSTEINE 600 MG: 200 SOLUTION ORAL; RESPIRATORY (INHALATION) at 06:54

## 2021-10-16 RX ADMIN — CEFTRIAXONE 2000 MG: 2 INJECTION, POWDER, FOR SOLUTION INTRAMUSCULAR; INTRAVENOUS at 16:03

## 2021-10-16 RX ADMIN — IPRATROPIUM BROMIDE 0.5 MG: 0.5 SOLUTION RESPIRATORY (INHALATION) at 21:43

## 2021-10-16 RX ADMIN — METOROPROLOL TARTRATE 2.5 MG: 5 INJECTION, SOLUTION INTRAVENOUS at 15:50

## 2021-10-16 RX ADMIN — LEVALBUTEROL HYDROCHLORIDE 1.25 MG: 1.25 SOLUTION, CONCENTRATE RESPIRATORY (INHALATION) at 06:54

## 2021-10-16 RX ADMIN — IPRATROPIUM BROMIDE 0.5 MG: 0.5 SOLUTION RESPIRATORY (INHALATION) at 06:53

## 2021-10-16 RX ADMIN — CLOPIDOGREL BISULFATE 75 MG: 75 TABLET ORAL at 15:24

## 2021-10-16 RX ADMIN — ACETYLCYSTEINE 600 MG: 200 SOLUTION ORAL; RESPIRATORY (INHALATION) at 13:47

## 2021-10-16 RX ADMIN — METOROPROLOL TARTRATE 2.5 MG: 5 INJECTION, SOLUTION INTRAVENOUS at 21:24

## 2021-10-16 RX ADMIN — LEVALBUTEROL HYDROCHLORIDE 1.25 MG: 1.25 SOLUTION, CONCENTRATE RESPIRATORY (INHALATION) at 13:47

## 2021-10-16 RX ADMIN — ACETYLCYSTEINE 600 MG: 200 SOLUTION ORAL; RESPIRATORY (INHALATION) at 21:43

## 2021-10-16 RX ADMIN — POTASSIUM CHLORIDE 40 MEQ: 29.8 INJECTION, SOLUTION INTRAVENOUS at 10:25

## 2021-10-16 RX ADMIN — INSULIN LISPRO 4 UNITS: 100 INJECTION, SOLUTION INTRAVENOUS; SUBCUTANEOUS at 12:22

## 2021-10-16 RX ADMIN — Medication 3 MG: at 01:29

## 2021-10-16 RX ADMIN — CHLORHEXIDINE GLUCONATE 15 ML: 1.2 SOLUTION ORAL at 09:45

## 2021-10-16 RX ADMIN — INSULIN GLARGINE 14 UNITS: 100 INJECTION, SOLUTION SUBCUTANEOUS at 10:24

## 2021-10-16 RX ADMIN — CHLORHEXIDINE GLUCONATE 15 ML: 1.2 SOLUTION ORAL at 21:24

## 2021-10-16 RX ADMIN — ATORVASTATIN CALCIUM 80 MG: 80 TABLET, FILM COATED ORAL at 16:06

## 2021-10-16 RX ADMIN — Medication 3 MG: at 21:28

## 2021-10-16 RX ADMIN — INSULIN LISPRO 2 UNITS: 100 INJECTION, SOLUTION INTRAVENOUS; SUBCUTANEOUS at 05:39

## 2021-10-16 RX ADMIN — PANTOPRAZOLE SODIUM 20 MG: 40 INJECTION, POWDER, FOR SOLUTION INTRAVENOUS at 10:21

## 2021-10-16 RX ADMIN — INSULIN LISPRO 4 UNITS: 100 INJECTION, SOLUTION INTRAVENOUS; SUBCUTANEOUS at 01:20

## 2021-10-16 RX ADMIN — HEPARIN SODIUM 5000 UNITS: 5000 INJECTION INTRAVENOUS; SUBCUTANEOUS at 15:25

## 2021-10-16 RX ADMIN — HYDROCORTISONE SODIUM SUCCINATE 25 MG: 100 INJECTION, POWDER, FOR SOLUTION INTRAMUSCULAR; INTRAVENOUS at 10:13

## 2021-10-16 RX ADMIN — INSULIN LISPRO 4 UNITS: 100 INJECTION, SOLUTION INTRAVENOUS; SUBCUTANEOUS at 19:53

## 2021-10-16 RX ADMIN — HEPARIN SODIUM 5000 UNITS: 5000 INJECTION INTRAVENOUS; SUBCUTANEOUS at 21:28

## 2021-10-16 RX ADMIN — LEVALBUTEROL HYDROCHLORIDE 1.25 MG: 1.25 SOLUTION, CONCENTRATE RESPIRATORY (INHALATION) at 21:43

## 2021-10-16 RX ADMIN — ASPIRIN 300 MG: 300 SUPPOSITORY RECTAL at 10:12

## 2021-10-16 RX ADMIN — HYDROCORTISONE SODIUM SUCCINATE 25 MG: 100 INJECTION, POWDER, FOR SOLUTION INTRAMUSCULAR; INTRAVENOUS at 21:26

## 2021-10-16 RX ADMIN — IPRATROPIUM BROMIDE 0.5 MG: 0.5 SOLUTION RESPIRATORY (INHALATION) at 13:47

## 2021-10-16 RX ADMIN — HEPARIN SODIUM 5000 UNITS: 5000 INJECTION INTRAVENOUS; SUBCUTANEOUS at 05:38

## 2021-10-16 RX ADMIN — POTASSIUM CHLORIDE 40 MEQ: 29.8 INJECTION, SOLUTION INTRAVENOUS at 15:10

## 2021-10-17 PROBLEM — R78.81 BACTEREMIA DUE TO KLEBSIELLA PNEUMONIAE: Status: ACTIVE | Noted: 2021-10-17

## 2021-10-17 PROBLEM — B96.1 BACTEREMIA DUE TO KLEBSIELLA PNEUMONIAE: Status: ACTIVE | Noted: 2021-10-17

## 2021-10-17 PROBLEM — E55.9 VITAMIN D DEFICIENCY: Status: ACTIVE | Noted: 2021-10-17

## 2021-10-17 PROBLEM — Z79.4 TYPE 2 DIABETES MELLITUS WITH CIRCULATORY DISORDER, WITH LONG-TERM CURRENT USE OF INSULIN (HCC): Status: ACTIVE | Noted: 2021-10-12

## 2021-10-17 PROBLEM — E11.59 TYPE 2 DIABETES MELLITUS WITH CIRCULATORY DISORDER, WITH LONG-TERM CURRENT USE OF INSULIN (HCC): Status: ACTIVE | Noted: 2021-10-12

## 2021-10-17 LAB
25(OH)D3 SERPL-MCNC: 15.8 NG/ML (ref 30–100)
ANION GAP SERPL CALCULATED.3IONS-SCNC: 2 MMOL/L (ref 4–13)
BACTERIA BLD CULT: NORMAL
BACTERIA BLD CULT: NORMAL
BUN SERPL-MCNC: 30 MG/DL (ref 5–25)
CALCIUM SERPL-MCNC: 7.8 MG/DL (ref 8.3–10.1)
CHLORIDE SERPL-SCNC: 116 MMOL/L (ref 100–108)
CO2 SERPL-SCNC: 28 MMOL/L (ref 21–32)
CREAT SERPL-MCNC: 0.93 MG/DL (ref 0.6–1.3)
ERYTHROCYTE [DISTWIDTH] IN BLOOD BY AUTOMATED COUNT: 15.3 % (ref 11.6–15.1)
FERRITIN SERPL-MCNC: 401 NG/ML (ref 8–388)
FOLATE SERPL-MCNC: 4.7 NG/ML (ref 3.1–17.5)
GFR SERPL CREATININE-BSD FRML MDRD: 80 ML/MIN/1.73SQ M
GLUCOSE SERPL-MCNC: 171 MG/DL (ref 65–140)
GLUCOSE SERPL-MCNC: 184 MG/DL (ref 65–140)
GLUCOSE SERPL-MCNC: 188 MG/DL (ref 65–140)
GLUCOSE SERPL-MCNC: 206 MG/DL (ref 65–140)
GLUCOSE SERPL-MCNC: 209 MG/DL (ref 65–140)
GLUCOSE SERPL-MCNC: 258 MG/DL (ref 65–140)
HCT VFR BLD AUTO: 25.5 % (ref 36.5–49.3)
HGB BLD-MCNC: 8.6 G/DL (ref 12–17)
IRON SATN MFR SERPL: 32 % (ref 20–50)
IRON SERPL-MCNC: 42 UG/DL (ref 65–175)
MCH RBC QN AUTO: 31.2 PG (ref 26.8–34.3)
MCHC RBC AUTO-ENTMCNC: 33.7 G/DL (ref 31.4–37.4)
MCV RBC AUTO: 92 FL (ref 82–98)
PLATELET # BLD AUTO: 103 THOUSANDS/UL (ref 149–390)
PMV BLD AUTO: 13 FL (ref 8.9–12.7)
POTASSIUM SERPL-SCNC: 3.4 MMOL/L (ref 3.5–5.3)
RBC # BLD AUTO: 2.76 MILLION/UL (ref 3.88–5.62)
SODIUM SERPL-SCNC: 146 MMOL/L (ref 136–145)
TIBC SERPL-MCNC: 131 UG/DL (ref 250–450)
VIT B12 SERPL-MCNC: 720 PG/ML (ref 100–900)
WBC # BLD AUTO: 7.83 THOUSAND/UL (ref 4.31–10.16)

## 2021-10-17 PROCEDURE — 82746 ASSAY OF FOLIC ACID SERUM: CPT | Performed by: INTERNAL MEDICINE

## 2021-10-17 PROCEDURE — 82948 REAGENT STRIP/BLOOD GLUCOSE: CPT

## 2021-10-17 PROCEDURE — 83550 IRON BINDING TEST: CPT | Performed by: INTERNAL MEDICINE

## 2021-10-17 PROCEDURE — 97167 OT EVAL HIGH COMPLEX 60 MIN: CPT

## 2021-10-17 PROCEDURE — 82306 VITAMIN D 25 HYDROXY: CPT | Performed by: STUDENT IN AN ORGANIZED HEALTH CARE EDUCATION/TRAINING PROGRAM

## 2021-10-17 PROCEDURE — 94669 MECHANICAL CHEST WALL OSCILL: CPT

## 2021-10-17 PROCEDURE — 97163 PT EVAL HIGH COMPLEX 45 MIN: CPT

## 2021-10-17 PROCEDURE — 99232 SBSQ HOSP IP/OBS MODERATE 35: CPT | Performed by: INTERNAL MEDICINE

## 2021-10-17 PROCEDURE — 85027 COMPLETE CBC AUTOMATED: CPT | Performed by: STUDENT IN AN ORGANIZED HEALTH CARE EDUCATION/TRAINING PROGRAM

## 2021-10-17 PROCEDURE — 94640 AIRWAY INHALATION TREATMENT: CPT

## 2021-10-17 PROCEDURE — C9113 INJ PANTOPRAZOLE SODIUM, VIA: HCPCS | Performed by: STUDENT IN AN ORGANIZED HEALTH CARE EDUCATION/TRAINING PROGRAM

## 2021-10-17 PROCEDURE — 82728 ASSAY OF FERRITIN: CPT | Performed by: INTERNAL MEDICINE

## 2021-10-17 PROCEDURE — 94760 N-INVAS EAR/PLS OXIMETRY 1: CPT

## 2021-10-17 PROCEDURE — 94668 MNPJ CHEST WALL SBSQ: CPT

## 2021-10-17 PROCEDURE — 82607 VITAMIN B-12: CPT | Performed by: INTERNAL MEDICINE

## 2021-10-17 PROCEDURE — 83540 ASSAY OF IRON: CPT | Performed by: INTERNAL MEDICINE

## 2021-10-17 PROCEDURE — 80048 BASIC METABOLIC PNL TOTAL CA: CPT | Performed by: STUDENT IN AN ORGANIZED HEALTH CARE EDUCATION/TRAINING PROGRAM

## 2021-10-17 RX ORDER — FUROSEMIDE 10 MG/ML
20 INJECTION INTRAMUSCULAR; INTRAVENOUS ONCE
Status: COMPLETED | OUTPATIENT
Start: 2021-10-17 | End: 2021-10-17

## 2021-10-17 RX ORDER — SODIUM CHLORIDE 450 MG/100ML
50 INJECTION, SOLUTION INTRAVENOUS CONTINUOUS
Status: DISCONTINUED | OUTPATIENT
Start: 2021-10-17 | End: 2021-10-18

## 2021-10-17 RX ORDER — ERGOCALCIFEROL 1.25 MG/1
50000 CAPSULE ORAL WEEKLY
Status: DISPENSED | OUTPATIENT
Start: 2021-10-17 | End: 2021-10-24

## 2021-10-17 RX ADMIN — LEVALBUTEROL HYDROCHLORIDE 1.25 MG: 1.25 SOLUTION, CONCENTRATE RESPIRATORY (INHALATION) at 20:52

## 2021-10-17 RX ADMIN — METOROPROLOL TARTRATE 2.5 MG: 5 INJECTION, SOLUTION INTRAVENOUS at 08:31

## 2021-10-17 RX ADMIN — FUROSEMIDE 20 MG: 10 INJECTION, SOLUTION INTRAMUSCULAR; INTRAVENOUS at 22:45

## 2021-10-17 RX ADMIN — INSULIN LISPRO 1 UNITS: 100 INJECTION, SOLUTION INTRAVENOUS; SUBCUTANEOUS at 21:42

## 2021-10-17 RX ADMIN — IPRATROPIUM BROMIDE 0.5 MG: 0.5 SOLUTION RESPIRATORY (INHALATION) at 07:50

## 2021-10-17 RX ADMIN — LEVALBUTEROL HYDROCHLORIDE 1.25 MG: 1.25 SOLUTION, CONCENTRATE RESPIRATORY (INHALATION) at 07:50

## 2021-10-17 RX ADMIN — CEFTRIAXONE 2000 MG: 2 INJECTION, POWDER, FOR SOLUTION INTRAMUSCULAR; INTRAVENOUS at 16:34

## 2021-10-17 RX ADMIN — LEVOTHYROXINE SODIUM 150 MCG: 75 TABLET ORAL at 05:55

## 2021-10-17 RX ADMIN — INSULIN LISPRO 4 UNITS: 100 INJECTION, SOLUTION INTRAVENOUS; SUBCUTANEOUS at 16:43

## 2021-10-17 RX ADMIN — IPRATROPIUM BROMIDE 0.5 MG: 0.5 SOLUTION RESPIRATORY (INHALATION) at 20:52

## 2021-10-17 RX ADMIN — HEPARIN SODIUM 5000 UNITS: 5000 INJECTION INTRAVENOUS; SUBCUTANEOUS at 05:55

## 2021-10-17 RX ADMIN — HEPARIN SODIUM 5000 UNITS: 5000 INJECTION INTRAVENOUS; SUBCUTANEOUS at 21:41

## 2021-10-17 RX ADMIN — METOROPROLOL TARTRATE 2.5 MG: 5 INJECTION, SOLUTION INTRAVENOUS at 21:38

## 2021-10-17 RX ADMIN — INSULIN LISPRO 2 UNITS: 100 INJECTION, SOLUTION INTRAVENOUS; SUBCUTANEOUS at 12:36

## 2021-10-17 RX ADMIN — HYDROCORTISONE SODIUM SUCCINATE 25 MG: 100 INJECTION, POWDER, FOR SOLUTION INTRAMUSCULAR; INTRAVENOUS at 08:30

## 2021-10-17 RX ADMIN — SODIUM CHLORIDE 50 ML/HR: 0.45 INJECTION, SOLUTION INTRAVENOUS at 16:44

## 2021-10-17 RX ADMIN — Medication 3 MG: at 21:42

## 2021-10-17 RX ADMIN — ATORVASTATIN CALCIUM 80 MG: 80 TABLET, FILM COATED ORAL at 16:43

## 2021-10-17 RX ADMIN — CHLORHEXIDINE GLUCONATE 15 ML: 1.2 SOLUTION ORAL at 08:26

## 2021-10-17 RX ADMIN — HEPARIN SODIUM 5000 UNITS: 5000 INJECTION INTRAVENOUS; SUBCUTANEOUS at 14:12

## 2021-10-17 RX ADMIN — METOROPROLOL TARTRATE 2.5 MG: 5 INJECTION, SOLUTION INTRAVENOUS at 14:14

## 2021-10-17 RX ADMIN — CLOPIDOGREL BISULFATE 75 MG: 75 TABLET ORAL at 08:27

## 2021-10-17 RX ADMIN — INSULIN LISPRO 4 UNITS: 100 INJECTION, SOLUTION INTRAVENOUS; SUBCUTANEOUS at 00:42

## 2021-10-17 RX ADMIN — INSULIN LISPRO 2 UNITS: 100 INJECTION, SOLUTION INTRAVENOUS; SUBCUTANEOUS at 05:55

## 2021-10-17 RX ADMIN — IPRATROPIUM BROMIDE 0.5 MG: 0.5 SOLUTION RESPIRATORY (INHALATION) at 13:37

## 2021-10-17 RX ADMIN — INSULIN LISPRO 3 UNITS: 100 INJECTION, SOLUTION INTRAVENOUS; SUBCUTANEOUS at 16:42

## 2021-10-17 RX ADMIN — INSULIN GLARGINE 14 UNITS: 100 INJECTION, SOLUTION SUBCUTANEOUS at 08:40

## 2021-10-17 RX ADMIN — INSULIN LISPRO 4 UNITS: 100 INJECTION, SOLUTION INTRAVENOUS; SUBCUTANEOUS at 12:37

## 2021-10-17 RX ADMIN — ASPIRIN 81 MG CHEWABLE TABLET 81 MG: 81 TABLET CHEWABLE at 08:33

## 2021-10-17 RX ADMIN — LEVALBUTEROL HYDROCHLORIDE 1.25 MG: 1.25 SOLUTION, CONCENTRATE RESPIRATORY (INHALATION) at 13:37

## 2021-10-17 RX ADMIN — PANTOPRAZOLE SODIUM 20 MG: 40 INJECTION, POWDER, FOR SOLUTION INTRAVENOUS at 08:27

## 2021-10-17 RX ADMIN — CHLORHEXIDINE GLUCONATE 15 ML: 1.2 SOLUTION ORAL at 21:47

## 2021-10-18 ENCOUNTER — APPOINTMENT (INPATIENT)
Dept: RADIOLOGY | Facility: HOSPITAL | Age: 76
DRG: 871 | End: 2021-10-18
Attending: INTERNAL MEDICINE
Payer: MEDICARE

## 2021-10-18 ENCOUNTER — APPOINTMENT (INPATIENT)
Dept: RADIOLOGY | Facility: HOSPITAL | Age: 76
DRG: 871 | End: 2021-10-18
Payer: MEDICARE

## 2021-10-18 PROBLEM — J96.01 ACUTE RESPIRATORY FAILURE WITH HYPOXIA (HCC): Status: ACTIVE | Noted: 2021-10-18

## 2021-10-18 PROBLEM — C61 PROSTATE CANCER (HCC): Status: ACTIVE | Noted: 2021-10-18

## 2021-10-18 LAB
ANION GAP SERPL CALCULATED.3IONS-SCNC: 4 MMOL/L (ref 4–13)
ATRIAL RATE: 113 BPM
BUN SERPL-MCNC: 25 MG/DL (ref 5–25)
CALCIUM SERPL-MCNC: 7.9 MG/DL (ref 8.3–10.1)
CHLORIDE SERPL-SCNC: 114 MMOL/L (ref 100–108)
CO2 SERPL-SCNC: 28 MMOL/L (ref 21–32)
CREAT SERPL-MCNC: 0.87 MG/DL (ref 0.6–1.3)
ERYTHROCYTE [DISTWIDTH] IN BLOOD BY AUTOMATED COUNT: 15.3 % (ref 11.6–15.1)
GFR SERPL CREATININE-BSD FRML MDRD: 84 ML/MIN/1.73SQ M
GLUCOSE SERPL-MCNC: 123 MG/DL (ref 65–140)
GLUCOSE SERPL-MCNC: 128 MG/DL (ref 65–140)
GLUCOSE SERPL-MCNC: 161 MG/DL (ref 65–140)
GLUCOSE SERPL-MCNC: 165 MG/DL (ref 65–140)
GLUCOSE SERPL-MCNC: 207 MG/DL (ref 65–140)
HCT VFR BLD AUTO: 27.3 % (ref 36.5–49.3)
HGB BLD-MCNC: 9.1 G/DL (ref 12–17)
MCH RBC QN AUTO: 31.3 PG (ref 26.8–34.3)
MCHC RBC AUTO-ENTMCNC: 33.3 G/DL (ref 31.4–37.4)
MCV RBC AUTO: 94 FL (ref 82–98)
NT-PROBNP SERPL-MCNC: ABNORMAL PG/ML
P AXIS: 62 DEGREES
PLATELET # BLD AUTO: 159 THOUSANDS/UL (ref 149–390)
PMV BLD AUTO: 12.7 FL (ref 8.9–12.7)
POTASSIUM SERPL-SCNC: 2.9 MMOL/L (ref 3.5–5.3)
POTASSIUM SERPL-SCNC: 3.5 MMOL/L (ref 3.5–5.3)
PR INTERVAL: 150 MS
QRS AXIS: -53 DEGREES
QRSD INTERVAL: 136 MS
QT INTERVAL: 370 MS
QTC INTERVAL: 507 MS
RBC # BLD AUTO: 2.91 MILLION/UL (ref 3.88–5.62)
SODIUM SERPL-SCNC: 146 MMOL/L (ref 136–145)
T WAVE AXIS: 66 DEGREES
VENTRICULAR RATE: 113 BPM
WBC # BLD AUTO: 12.52 THOUSAND/UL (ref 4.31–10.16)

## 2021-10-18 PROCEDURE — 99232 SBSQ HOSP IP/OBS MODERATE 35: CPT | Performed by: INTERNAL MEDICINE

## 2021-10-18 PROCEDURE — 94664 DEMO&/EVAL PT USE INHALER: CPT

## 2021-10-18 PROCEDURE — 82948 REAGENT STRIP/BLOOD GLUCOSE: CPT

## 2021-10-18 PROCEDURE — 94640 AIRWAY INHALATION TREATMENT: CPT

## 2021-10-18 PROCEDURE — 94668 MNPJ CHEST WALL SBSQ: CPT

## 2021-10-18 PROCEDURE — 94669 MECHANICAL CHEST WALL OSCILL: CPT

## 2021-10-18 PROCEDURE — 92611 MOTION FLUOROSCOPY/SWALLOW: CPT

## 2021-10-18 PROCEDURE — 83880 ASSAY OF NATRIURETIC PEPTIDE: CPT | Performed by: PHYSICIAN ASSISTANT

## 2021-10-18 PROCEDURE — C9113 INJ PANTOPRAZOLE SODIUM, VIA: HCPCS | Performed by: STUDENT IN AN ORGANIZED HEALTH CARE EDUCATION/TRAINING PROGRAM

## 2021-10-18 PROCEDURE — 80048 BASIC METABOLIC PNL TOTAL CA: CPT | Performed by: INTERNAL MEDICINE

## 2021-10-18 PROCEDURE — 93005 ELECTROCARDIOGRAM TRACING: CPT

## 2021-10-18 PROCEDURE — 85027 COMPLETE CBC AUTOMATED: CPT | Performed by: INTERNAL MEDICINE

## 2021-10-18 PROCEDURE — 94760 N-INVAS EAR/PLS OXIMETRY 1: CPT

## 2021-10-18 PROCEDURE — 93010 ELECTROCARDIOGRAM REPORT: CPT | Performed by: INTERNAL MEDICINE

## 2021-10-18 PROCEDURE — 84132 ASSAY OF SERUM POTASSIUM: CPT | Performed by: STUDENT IN AN ORGANIZED HEALTH CARE EDUCATION/TRAINING PROGRAM

## 2021-10-18 PROCEDURE — 71045 X-RAY EXAM CHEST 1 VIEW: CPT

## 2021-10-18 PROCEDURE — 74230 X-RAY XM SWLNG FUNCJ C+: CPT

## 2021-10-18 RX ORDER — FUROSEMIDE 10 MG/ML
20 INJECTION INTRAMUSCULAR; INTRAVENOUS DAILY
Status: DISCONTINUED | OUTPATIENT
Start: 2021-10-19 | End: 2021-10-22

## 2021-10-18 RX ORDER — FUROSEMIDE 10 MG/ML
40 INJECTION INTRAMUSCULAR; INTRAVENOUS ONCE
Status: COMPLETED | OUTPATIENT
Start: 2021-10-18 | End: 2021-10-18

## 2021-10-18 RX ORDER — FUROSEMIDE 10 MG/ML
40 INJECTION INTRAMUSCULAR; INTRAVENOUS ONCE
Status: DISCONTINUED | OUTPATIENT
Start: 2021-10-18 | End: 2021-10-18

## 2021-10-18 RX ORDER — ONDANSETRON 2 MG/ML
4 INJECTION INTRAMUSCULAR; INTRAVENOUS EVERY 4 HOURS PRN
Status: DISCONTINUED | OUTPATIENT
Start: 2021-10-18 | End: 2021-10-29 | Stop reason: HOSPADM

## 2021-10-18 RX ORDER — POTASSIUM CHLORIDE 20 MEQ/1
20 TABLET, EXTENDED RELEASE ORAL
Status: COMPLETED | OUTPATIENT
Start: 2021-10-18 | End: 2021-10-18

## 2021-10-18 RX ORDER — POTASSIUM CHLORIDE 20 MEQ/1
40 TABLET, EXTENDED RELEASE ORAL ONCE
Status: COMPLETED | OUTPATIENT
Start: 2021-10-18 | End: 2021-10-18

## 2021-10-18 RX ORDER — POTASSIUM CHLORIDE 14.9 MG/ML
20 INJECTION INTRAVENOUS ONCE
Status: COMPLETED | OUTPATIENT
Start: 2021-10-18 | End: 2021-10-18

## 2021-10-18 RX ADMIN — METOPROLOL TARTRATE 25 MG: 25 TABLET, FILM COATED ORAL at 08:14

## 2021-10-18 RX ADMIN — LEVALBUTEROL HYDROCHLORIDE 1.25 MG: 1.25 SOLUTION, CONCENTRATE RESPIRATORY (INHALATION) at 19:06

## 2021-10-18 RX ADMIN — CEFTRIAXONE 2000 MG: 2 INJECTION, POWDER, FOR SOLUTION INTRAMUSCULAR; INTRAVENOUS at 17:07

## 2021-10-18 RX ADMIN — LEVALBUTEROL HYDROCHLORIDE 1.25 MG: 1.25 SOLUTION, CONCENTRATE RESPIRATORY (INHALATION) at 13:08

## 2021-10-18 RX ADMIN — ATORVASTATIN CALCIUM 80 MG: 80 TABLET, FILM COATED ORAL at 17:08

## 2021-10-18 RX ADMIN — INSULIN LISPRO 1 UNITS: 100 INJECTION, SOLUTION INTRAVENOUS; SUBCUTANEOUS at 08:16

## 2021-10-18 RX ADMIN — INSULIN LISPRO 4 UNITS: 100 INJECTION, SOLUTION INTRAVENOUS; SUBCUTANEOUS at 08:16

## 2021-10-18 RX ADMIN — METOPROLOL TARTRATE 25 MG: 25 TABLET, FILM COATED ORAL at 22:51

## 2021-10-18 RX ADMIN — IPRATROPIUM BROMIDE 0.5 MG: 0.5 SOLUTION RESPIRATORY (INHALATION) at 13:08

## 2021-10-18 RX ADMIN — CHLORHEXIDINE GLUCONATE 15 ML: 1.2 SOLUTION ORAL at 08:21

## 2021-10-18 RX ADMIN — CHLORHEXIDINE GLUCONATE 15 ML: 1.2 SOLUTION ORAL at 22:51

## 2021-10-18 RX ADMIN — ASPIRIN 81 MG CHEWABLE TABLET 81 MG: 81 TABLET CHEWABLE at 08:14

## 2021-10-18 RX ADMIN — PANTOPRAZOLE SODIUM 20 MG: 40 INJECTION, POWDER, FOR SOLUTION INTRAVENOUS at 08:14

## 2021-10-18 RX ADMIN — CLOPIDOGREL BISULFATE 75 MG: 75 TABLET ORAL at 08:14

## 2021-10-18 RX ADMIN — INSULIN GLARGINE 14 UNITS: 100 INJECTION, SOLUTION SUBCUTANEOUS at 08:21

## 2021-10-18 RX ADMIN — FUROSEMIDE 40 MG: 10 INJECTION, SOLUTION INTRAMUSCULAR; INTRAVENOUS at 17:19

## 2021-10-18 RX ADMIN — POTASSIUM CHLORIDE 20 MEQ: 14.9 INJECTION, SOLUTION INTRAVENOUS at 08:21

## 2021-10-18 RX ADMIN — POTASSIUM CHLORIDE 40 MEQ: 1500 TABLET, EXTENDED RELEASE ORAL at 08:13

## 2021-10-18 RX ADMIN — ONDANSETRON 4 MG: 2 INJECTION INTRAMUSCULAR; INTRAVENOUS at 05:34

## 2021-10-18 RX ADMIN — LEVALBUTEROL HYDROCHLORIDE 1.25 MG: 1.25 SOLUTION, CONCENTRATE RESPIRATORY (INHALATION) at 06:53

## 2021-10-18 RX ADMIN — HEPARIN SODIUM 5000 UNITS: 5000 INJECTION INTRAVENOUS; SUBCUTANEOUS at 17:07

## 2021-10-18 RX ADMIN — HEPARIN SODIUM 5000 UNITS: 5000 INJECTION INTRAVENOUS; SUBCUTANEOUS at 05:12

## 2021-10-18 RX ADMIN — HEPARIN SODIUM 5000 UNITS: 5000 INJECTION INTRAVENOUS; SUBCUTANEOUS at 22:51

## 2021-10-18 RX ADMIN — IPRATROPIUM BROMIDE 0.5 MG: 0.5 SOLUTION RESPIRATORY (INHALATION) at 06:53

## 2021-10-18 RX ADMIN — INSULIN LISPRO 2 UNITS: 100 INJECTION, SOLUTION INTRAVENOUS; SUBCUTANEOUS at 12:32

## 2021-10-18 RX ADMIN — IPRATROPIUM BROMIDE 0.5 MG: 0.5 SOLUTION RESPIRATORY (INHALATION) at 19:06

## 2021-10-18 RX ADMIN — LEVOTHYROXINE SODIUM 150 MCG: 75 TABLET ORAL at 05:12

## 2021-10-18 RX ADMIN — POTASSIUM CHLORIDE 20 MEQ: 1500 TABLET, EXTENDED RELEASE ORAL at 12:31

## 2021-10-18 RX ADMIN — HYDROCORTISONE SODIUM SUCCINATE 25 MG: 100 INJECTION, POWDER, FOR SOLUTION INTRAMUSCULAR; INTRAVENOUS at 08:13

## 2021-10-18 RX ADMIN — Medication 3 MG: at 22:51

## 2021-10-18 RX ADMIN — HYDROCORTISONE SODIUM SUCCINATE 25 MG: 100 INJECTION, POWDER, FOR SOLUTION INTRAMUSCULAR; INTRAVENOUS at 22:51

## 2021-10-18 RX ADMIN — INSULIN LISPRO 4 UNITS: 100 INJECTION, SOLUTION INTRAVENOUS; SUBCUTANEOUS at 12:32

## 2021-10-19 LAB
AMPHETAMINES UR QL SCN: NEGATIVE NG/ML
ANION GAP SERPL CALCULATED.3IONS-SCNC: 4 MMOL/L (ref 4–13)
ANISOCYTOSIS BLD QL SMEAR: PRESENT
ARTIFACT: PRESENT
BARBITURATES UR QL SCN: NEGATIVE NG/ML
BASOPHILS # BLD MANUAL: 0 THOUSAND/UL (ref 0–0.1)
BASOPHILS NFR MAR MANUAL: 0 % (ref 0–1)
BENZODIAZ UR QL: NEGATIVE
BUN SERPL-MCNC: 23 MG/DL (ref 5–25)
BZE UR QL: NEGATIVE NG/ML
CALCIUM SERPL-MCNC: 7.7 MG/DL (ref 8.3–10.1)
CANNABINOIDS UR QL SCN: NEGATIVE NG/ML
CHLORIDE SERPL-SCNC: 110 MMOL/L (ref 100–108)
CO2 SERPL-SCNC: 30 MMOL/L (ref 21–32)
CREAT SERPL-MCNC: 0.86 MG/DL (ref 0.6–1.3)
EOSINOPHIL # BLD MANUAL: 0.15 THOUSAND/UL (ref 0–0.4)
EOSINOPHIL NFR BLD MANUAL: 1 % (ref 0–6)
ERYTHROCYTE [DISTWIDTH] IN BLOOD BY AUTOMATED COUNT: 15.1 % (ref 11.6–15.1)
FUNGUS SPEC CULT: ABNORMAL
GFR SERPL CREATININE-BSD FRML MDRD: 85 ML/MIN/1.73SQ M
GIANT PLATELETS BLD QL SMEAR: PRESENT
GLUCOSE SERPL-MCNC: 147 MG/DL (ref 65–140)
GLUCOSE SERPL-MCNC: 180 MG/DL (ref 65–140)
GLUCOSE SERPL-MCNC: 194 MG/DL (ref 65–140)
GLUCOSE SERPL-MCNC: 243 MG/DL (ref 65–140)
GLUCOSE SERPL-MCNC: 93 MG/DL (ref 65–140)
HCT VFR BLD AUTO: 28.1 % (ref 36.5–49.3)
HGB BLD-MCNC: 9.3 G/DL (ref 12–17)
LYMPHOCYTES # BLD AUTO: 0.15 THOUSAND/UL (ref 0.6–4.47)
LYMPHOCYTES # BLD AUTO: 1 % (ref 14–44)
MAGNESIUM SERPL-MCNC: 2.4 MG/DL (ref 1.6–2.6)
MCH RBC QN AUTO: 31.2 PG (ref 26.8–34.3)
MCHC RBC AUTO-ENTMCNC: 33.1 G/DL (ref 31.4–37.4)
MCV RBC AUTO: 94 FL (ref 82–98)
METHADONE UR QL SCN: NEGATIVE NG/ML
MONOCYTES # BLD AUTO: 0.61 THOUSAND/UL (ref 0–1.22)
MONOCYTES NFR BLD: 4 % (ref 4–12)
NEUTROPHILS # BLD MANUAL: 14.35 THOUSAND/UL (ref 1.85–7.62)
NEUTS BAND NFR BLD MANUAL: 21 % (ref 0–8)
NEUTS SEG NFR BLD AUTO: 73 % (ref 43–75)
NRBC BLD AUTO-RTO: 2 /100 WBC (ref 0–2)
OPIATES UR QL: NEGATIVE NG/ML
PCP UR QL: NEGATIVE NG/ML
PLATELET # BLD AUTO: 190 THOUSANDS/UL (ref 149–390)
PLATELET BLD QL SMEAR: ADEQUATE
PMV BLD AUTO: 11.8 FL (ref 8.9–12.7)
POLYCHROMASIA BLD QL SMEAR: PRESENT
POTASSIUM SERPL-SCNC: 3.2 MMOL/L (ref 3.5–5.3)
PROPOXYPH UR QL SCN: NEGATIVE NG/ML
RBC # BLD AUTO: 2.98 MILLION/UL (ref 3.88–5.62)
RBC MORPH BLD: PRESENT
SODIUM SERPL-SCNC: 144 MMOL/L (ref 136–145)
WBC # BLD AUTO: 15.27 THOUSAND/UL (ref 4.31–10.16)

## 2021-10-19 PROCEDURE — 99233 SBSQ HOSP IP/OBS HIGH 50: CPT | Performed by: INTERNAL MEDICINE

## 2021-10-19 PROCEDURE — C9113 INJ PANTOPRAZOLE SODIUM, VIA: HCPCS | Performed by: STUDENT IN AN ORGANIZED HEALTH CARE EDUCATION/TRAINING PROGRAM

## 2021-10-19 PROCEDURE — 97530 THERAPEUTIC ACTIVITIES: CPT

## 2021-10-19 PROCEDURE — 83735 ASSAY OF MAGNESIUM: CPT | Performed by: PHYSICIAN ASSISTANT

## 2021-10-19 PROCEDURE — 94760 N-INVAS EAR/PLS OXIMETRY 1: CPT

## 2021-10-19 PROCEDURE — 82948 REAGENT STRIP/BLOOD GLUCOSE: CPT

## 2021-10-19 PROCEDURE — 80048 BASIC METABOLIC PNL TOTAL CA: CPT | Performed by: STUDENT IN AN ORGANIZED HEALTH CARE EDUCATION/TRAINING PROGRAM

## 2021-10-19 PROCEDURE — 85027 COMPLETE CBC AUTOMATED: CPT | Performed by: STUDENT IN AN ORGANIZED HEALTH CARE EDUCATION/TRAINING PROGRAM

## 2021-10-19 PROCEDURE — 94640 AIRWAY INHALATION TREATMENT: CPT

## 2021-10-19 PROCEDURE — 85007 BL SMEAR W/DIFF WBC COUNT: CPT | Performed by: STUDENT IN AN ORGANIZED HEALTH CARE EDUCATION/TRAINING PROGRAM

## 2021-10-19 RX ORDER — POTASSIUM CHLORIDE 20 MEQ/1
20 TABLET, EXTENDED RELEASE ORAL 2 TIMES DAILY
Status: COMPLETED | OUTPATIENT
Start: 2021-10-19 | End: 2021-10-19

## 2021-10-19 RX ORDER — POTASSIUM CHLORIDE 14.9 MG/ML
20 INJECTION INTRAVENOUS ONCE
Status: DISCONTINUED | OUTPATIENT
Start: 2021-10-19 | End: 2021-10-19

## 2021-10-19 RX ADMIN — PANTOPRAZOLE SODIUM 20 MG: 40 INJECTION, POWDER, FOR SOLUTION INTRAVENOUS at 08:10

## 2021-10-19 RX ADMIN — INSULIN GLARGINE 14 UNITS: 100 INJECTION, SOLUTION SUBCUTANEOUS at 08:11

## 2021-10-19 RX ADMIN — POTASSIUM CHLORIDE 20 MEQ: 1500 TABLET, EXTENDED RELEASE ORAL at 10:36

## 2021-10-19 RX ADMIN — HEPARIN SODIUM 5000 UNITS: 5000 INJECTION INTRAVENOUS; SUBCUTANEOUS at 21:51

## 2021-10-19 RX ADMIN — ATORVASTATIN CALCIUM 80 MG: 80 TABLET, FILM COATED ORAL at 16:30

## 2021-10-19 RX ADMIN — LEVALBUTEROL HYDROCHLORIDE 1.25 MG: 1.25 SOLUTION, CONCENTRATE RESPIRATORY (INHALATION) at 19:57

## 2021-10-19 RX ADMIN — HEPARIN SODIUM 5000 UNITS: 5000 INJECTION INTRAVENOUS; SUBCUTANEOUS at 14:33

## 2021-10-19 RX ADMIN — METOPROLOL TARTRATE 25 MG: 25 TABLET, FILM COATED ORAL at 21:51

## 2021-10-19 RX ADMIN — HEPARIN SODIUM 5000 UNITS: 5000 INJECTION INTRAVENOUS; SUBCUTANEOUS at 05:47

## 2021-10-19 RX ADMIN — ASPIRIN 81 MG CHEWABLE TABLET 81 MG: 81 TABLET CHEWABLE at 08:11

## 2021-10-19 RX ADMIN — CHLORHEXIDINE GLUCONATE 15 ML: 1.2 SOLUTION ORAL at 08:10

## 2021-10-19 RX ADMIN — CEFTRIAXONE 2000 MG: 2 INJECTION, POWDER, FOR SOLUTION INTRAMUSCULAR; INTRAVENOUS at 16:30

## 2021-10-19 RX ADMIN — Medication 3 MG: at 21:51

## 2021-10-19 RX ADMIN — INSULIN LISPRO 3 UNITS: 100 INJECTION, SOLUTION INTRAVENOUS; SUBCUTANEOUS at 08:09

## 2021-10-19 RX ADMIN — IPRATROPIUM BROMIDE 0.5 MG: 0.5 SOLUTION RESPIRATORY (INHALATION) at 19:57

## 2021-10-19 RX ADMIN — FUROSEMIDE 20 MG: 10 INJECTION, SOLUTION INTRAMUSCULAR; INTRAVENOUS at 08:10

## 2021-10-19 RX ADMIN — METOPROLOL TARTRATE 25 MG: 25 TABLET, FILM COATED ORAL at 08:10

## 2021-10-19 RX ADMIN — CLOPIDOGREL BISULFATE 75 MG: 75 TABLET ORAL at 08:10

## 2021-10-19 RX ADMIN — INSULIN LISPRO 1 UNITS: 100 INJECTION, SOLUTION INTRAVENOUS; SUBCUTANEOUS at 12:27

## 2021-10-19 RX ADMIN — LEVOTHYROXINE SODIUM 150 MCG: 75 TABLET ORAL at 05:47

## 2021-10-20 LAB
ANION GAP SERPL CALCULATED.3IONS-SCNC: 6 MMOL/L (ref 4–13)
BUN SERPL-MCNC: 20 MG/DL (ref 5–25)
CALCIUM SERPL-MCNC: 6.9 MG/DL (ref 8.3–10.1)
CHLORIDE SERPL-SCNC: 109 MMOL/L (ref 100–108)
CO2 SERPL-SCNC: 29 MMOL/L (ref 21–32)
CREAT SERPL-MCNC: 0.81 MG/DL (ref 0.6–1.3)
ERYTHROCYTE [DISTWIDTH] IN BLOOD BY AUTOMATED COUNT: 14.6 % (ref 11.6–15.1)
GFR SERPL CREATININE-BSD FRML MDRD: 87 ML/MIN/1.73SQ M
GLUCOSE SERPL-MCNC: 135 MG/DL (ref 65–140)
GLUCOSE SERPL-MCNC: 200 MG/DL (ref 65–140)
GLUCOSE SERPL-MCNC: 210 MG/DL (ref 65–140)
GLUCOSE SERPL-MCNC: 224 MG/DL (ref 65–140)
GLUCOSE SERPL-MCNC: 68 MG/DL (ref 65–140)
GLUCOSE SERPL-MCNC: 79 MG/DL (ref 65–140)
HCT VFR BLD AUTO: 24.5 % (ref 36.5–49.3)
HGB BLD-MCNC: 8.2 G/DL (ref 12–17)
MCH RBC QN AUTO: 31.4 PG (ref 26.8–34.3)
MCHC RBC AUTO-ENTMCNC: 33.5 G/DL (ref 31.4–37.4)
MCV RBC AUTO: 94 FL (ref 82–98)
NRBC BLD AUTO-RTO: 0 /100 WBCS
PLATELET # BLD AUTO: 166 THOUSANDS/UL (ref 149–390)
PMV BLD AUTO: 12 FL (ref 8.9–12.7)
POTASSIUM SERPL-SCNC: 3.2 MMOL/L (ref 3.5–5.3)
RBC # BLD AUTO: 2.61 MILLION/UL (ref 3.88–5.62)
SODIUM SERPL-SCNC: 144 MMOL/L (ref 136–145)
WBC # BLD AUTO: 9.59 THOUSAND/UL (ref 4.31–10.16)

## 2021-10-20 PROCEDURE — C9113 INJ PANTOPRAZOLE SODIUM, VIA: HCPCS | Performed by: STUDENT IN AN ORGANIZED HEALTH CARE EDUCATION/TRAINING PROGRAM

## 2021-10-20 PROCEDURE — 99233 SBSQ HOSP IP/OBS HIGH 50: CPT | Performed by: INTERNAL MEDICINE

## 2021-10-20 PROCEDURE — 94760 N-INVAS EAR/PLS OXIMETRY 1: CPT

## 2021-10-20 PROCEDURE — 94640 AIRWAY INHALATION TREATMENT: CPT

## 2021-10-20 PROCEDURE — 82948 REAGENT STRIP/BLOOD GLUCOSE: CPT

## 2021-10-20 PROCEDURE — 85027 COMPLETE CBC AUTOMATED: CPT | Performed by: INTERNAL MEDICINE

## 2021-10-20 PROCEDURE — 97535 SELF CARE MNGMENT TRAINING: CPT

## 2021-10-20 PROCEDURE — 80048 BASIC METABOLIC PNL TOTAL CA: CPT | Performed by: INTERNAL MEDICINE

## 2021-10-20 RX ORDER — POTASSIUM CHLORIDE 20MEQ/15ML
40 LIQUID (ML) ORAL ONCE
Status: DISCONTINUED | OUTPATIENT
Start: 2021-10-20 | End: 2021-10-22

## 2021-10-20 RX ORDER — POTASSIUM CHLORIDE 20 MEQ/1
40 TABLET, EXTENDED RELEASE ORAL 2 TIMES DAILY
Status: COMPLETED | OUTPATIENT
Start: 2021-10-20 | End: 2021-10-20

## 2021-10-20 RX ORDER — FUROSEMIDE 10 MG/ML
20 INJECTION INTRAMUSCULAR; INTRAVENOUS ONCE
Status: COMPLETED | OUTPATIENT
Start: 2021-10-20 | End: 2021-10-20

## 2021-10-20 RX ADMIN — POTASSIUM CHLORIDE 40 MEQ: 1500 TABLET, EXTENDED RELEASE ORAL at 10:51

## 2021-10-20 RX ADMIN — CHLORHEXIDINE GLUCONATE 15 ML: 1.2 SOLUTION ORAL at 08:36

## 2021-10-20 RX ADMIN — FUROSEMIDE 20 MG: 10 INJECTION, SOLUTION INTRAMUSCULAR; INTRAVENOUS at 16:49

## 2021-10-20 RX ADMIN — INSULIN GLARGINE 14 UNITS: 100 INJECTION, SOLUTION SUBCUTANEOUS at 08:43

## 2021-10-20 RX ADMIN — HEPARIN SODIUM 5000 UNITS: 5000 INJECTION INTRAVENOUS; SUBCUTANEOUS at 14:28

## 2021-10-20 RX ADMIN — Medication 3 MG: at 21:40

## 2021-10-20 RX ADMIN — HEPARIN SODIUM 5000 UNITS: 5000 INJECTION INTRAVENOUS; SUBCUTANEOUS at 21:40

## 2021-10-20 RX ADMIN — LEVALBUTEROL HYDROCHLORIDE 1.25 MG: 1.25 SOLUTION, CONCENTRATE RESPIRATORY (INHALATION) at 13:26

## 2021-10-20 RX ADMIN — IPRATROPIUM BROMIDE 0.5 MG: 0.5 SOLUTION RESPIRATORY (INHALATION) at 07:21

## 2021-10-20 RX ADMIN — CLOPIDOGREL BISULFATE 75 MG: 75 TABLET ORAL at 08:36

## 2021-10-20 RX ADMIN — IPRATROPIUM BROMIDE 0.5 MG: 0.5 SOLUTION RESPIRATORY (INHALATION) at 13:26

## 2021-10-20 RX ADMIN — HEPARIN SODIUM 5000 UNITS: 5000 INJECTION INTRAVENOUS; SUBCUTANEOUS at 06:21

## 2021-10-20 RX ADMIN — INSULIN LISPRO 2 UNITS: 100 INJECTION, SOLUTION INTRAVENOUS; SUBCUTANEOUS at 10:54

## 2021-10-20 RX ADMIN — ATORVASTATIN CALCIUM 80 MG: 80 TABLET, FILM COATED ORAL at 16:50

## 2021-10-20 RX ADMIN — ASPIRIN 81 MG CHEWABLE TABLET 81 MG: 81 TABLET CHEWABLE at 08:36

## 2021-10-20 RX ADMIN — LEVOTHYROXINE SODIUM 150 MCG: 75 TABLET ORAL at 06:21

## 2021-10-20 RX ADMIN — LEVALBUTEROL HYDROCHLORIDE 1.25 MG: 1.25 SOLUTION, CONCENTRATE RESPIRATORY (INHALATION) at 07:22

## 2021-10-20 RX ADMIN — INSULIN LISPRO 2 UNITS: 100 INJECTION, SOLUTION INTRAVENOUS; SUBCUTANEOUS at 16:50

## 2021-10-20 RX ADMIN — CHLORHEXIDINE GLUCONATE 15 ML: 1.2 SOLUTION ORAL at 21:40

## 2021-10-20 RX ADMIN — ACETAMINOPHEN 650 MG: 650 SUSPENSION ORAL at 08:37

## 2021-10-20 RX ADMIN — PANTOPRAZOLE SODIUM 20 MG: 40 INJECTION, POWDER, FOR SOLUTION INTRAVENOUS at 08:36

## 2021-10-21 LAB
ANION GAP SERPL CALCULATED.3IONS-SCNC: 4 MMOL/L (ref 4–13)
BUN SERPL-MCNC: 15 MG/DL (ref 5–25)
CALCIUM SERPL-MCNC: 7.6 MG/DL (ref 8.3–10.1)
CHLORIDE SERPL-SCNC: 110 MMOL/L (ref 100–108)
CO2 SERPL-SCNC: 30 MMOL/L (ref 21–32)
CREAT SERPL-MCNC: 0.85 MG/DL (ref 0.6–1.3)
ERYTHROCYTE [DISTWIDTH] IN BLOOD BY AUTOMATED COUNT: 14.6 % (ref 11.6–15.1)
GFR SERPL CREATININE-BSD FRML MDRD: 85 ML/MIN/1.73SQ M
GLUCOSE SERPL-MCNC: 127 MG/DL (ref 65–140)
GLUCOSE SERPL-MCNC: 166 MG/DL (ref 65–140)
GLUCOSE SERPL-MCNC: 175 MG/DL (ref 65–140)
GLUCOSE SERPL-MCNC: 216 MG/DL (ref 65–140)
GLUCOSE SERPL-MCNC: 292 MG/DL (ref 65–140)
HCT VFR BLD AUTO: 26.6 % (ref 36.5–49.3)
HGB BLD-MCNC: 8.5 G/DL (ref 12–17)
MCH RBC QN AUTO: 30.9 PG (ref 26.8–34.3)
MCHC RBC AUTO-ENTMCNC: 32 G/DL (ref 31.4–37.4)
MCV RBC AUTO: 97 FL (ref 82–98)
PLATELET # BLD AUTO: 206 THOUSANDS/UL (ref 149–390)
PMV BLD AUTO: 11.9 FL (ref 8.9–12.7)
POTASSIUM SERPL-SCNC: 3 MMOL/L (ref 3.5–5.3)
RBC # BLD AUTO: 2.75 MILLION/UL (ref 3.88–5.62)
SODIUM SERPL-SCNC: 144 MMOL/L (ref 136–145)
WBC # BLD AUTO: 3.61 THOUSAND/UL (ref 4.31–10.16)

## 2021-10-21 PROCEDURE — 85027 COMPLETE CBC AUTOMATED: CPT | Performed by: STUDENT IN AN ORGANIZED HEALTH CARE EDUCATION/TRAINING PROGRAM

## 2021-10-21 PROCEDURE — 97535 SELF CARE MNGMENT TRAINING: CPT

## 2021-10-21 PROCEDURE — 97112 NEUROMUSCULAR REEDUCATION: CPT

## 2021-10-21 PROCEDURE — 80048 BASIC METABOLIC PNL TOTAL CA: CPT | Performed by: STUDENT IN AN ORGANIZED HEALTH CARE EDUCATION/TRAINING PROGRAM

## 2021-10-21 PROCEDURE — 82948 REAGENT STRIP/BLOOD GLUCOSE: CPT

## 2021-10-21 PROCEDURE — 99233 SBSQ HOSP IP/OBS HIGH 50: CPT | Performed by: INTERNAL MEDICINE

## 2021-10-21 PROCEDURE — 97530 THERAPEUTIC ACTIVITIES: CPT

## 2021-10-21 PROCEDURE — 02HV33Z INSERTION OF INFUSION DEVICE INTO SUPERIOR VENA CAVA, PERCUTANEOUS APPROACH: ICD-10-PCS | Performed by: ANESTHESIOLOGY

## 2021-10-21 PROCEDURE — 94760 N-INVAS EAR/PLS OXIMETRY 1: CPT

## 2021-10-21 PROCEDURE — C9113 INJ PANTOPRAZOLE SODIUM, VIA: HCPCS | Performed by: STUDENT IN AN ORGANIZED HEALTH CARE EDUCATION/TRAINING PROGRAM

## 2021-10-21 RX ORDER — ALBUTEROL SULFATE 2.5 MG/3ML
2.5 SOLUTION RESPIRATORY (INHALATION) EVERY 6 HOURS PRN
Status: DISCONTINUED | OUTPATIENT
Start: 2021-10-21 | End: 2021-10-26

## 2021-10-21 RX ORDER — LOPERAMIDE HYDROCHLORIDE 2 MG/1
2 CAPSULE ORAL ONCE AS NEEDED
Status: DISCONTINUED | OUTPATIENT
Start: 2021-10-21 | End: 2021-10-25

## 2021-10-21 RX ORDER — PANTOPRAZOLE SODIUM 20 MG/1
20 TABLET, DELAYED RELEASE ORAL
Status: DISCONTINUED | OUTPATIENT
Start: 2021-10-22 | End: 2021-10-26

## 2021-10-21 RX ADMIN — Medication 3 MG: at 21:24

## 2021-10-21 RX ADMIN — INSULIN GLARGINE 14 UNITS: 100 INJECTION, SOLUTION SUBCUTANEOUS at 08:21

## 2021-10-21 RX ADMIN — METOPROLOL TARTRATE 25 MG: 25 TABLET, FILM COATED ORAL at 08:08

## 2021-10-21 RX ADMIN — LEVOTHYROXINE SODIUM 150 MCG: 75 TABLET ORAL at 05:56

## 2021-10-21 RX ADMIN — ACETAMINOPHEN 650 MG: 650 SUSPENSION ORAL at 21:25

## 2021-10-21 RX ADMIN — CHLORHEXIDINE GLUCONATE 15 ML: 1.2 SOLUTION ORAL at 08:08

## 2021-10-21 RX ADMIN — INSULIN LISPRO 1 UNITS: 100 INJECTION, SOLUTION INTRAVENOUS; SUBCUTANEOUS at 08:21

## 2021-10-21 RX ADMIN — HEPARIN SODIUM 5000 UNITS: 5000 INJECTION INTRAVENOUS; SUBCUTANEOUS at 05:56

## 2021-10-21 RX ADMIN — FUROSEMIDE 20 MG: 10 INJECTION, SOLUTION INTRAMUSCULAR; INTRAVENOUS at 08:09

## 2021-10-21 RX ADMIN — ASPIRIN 81 MG CHEWABLE TABLET 81 MG: 81 TABLET CHEWABLE at 08:08

## 2021-10-21 RX ADMIN — HEPARIN SODIUM 5000 UNITS: 5000 INJECTION INTRAVENOUS; SUBCUTANEOUS at 21:26

## 2021-10-21 RX ADMIN — CLOPIDOGREL BISULFATE 75 MG: 75 TABLET ORAL at 08:08

## 2021-10-21 RX ADMIN — INSULIN LISPRO 2 UNITS: 100 INJECTION, SOLUTION INTRAVENOUS; SUBCUTANEOUS at 12:08

## 2021-10-21 RX ADMIN — PANTOPRAZOLE SODIUM 20 MG: 40 INJECTION, POWDER, FOR SOLUTION INTRAVENOUS at 08:09

## 2021-10-21 RX ADMIN — INSULIN LISPRO 2 UNITS: 100 INJECTION, SOLUTION INTRAVENOUS; SUBCUTANEOUS at 21:25

## 2021-10-21 RX ADMIN — CHLORHEXIDINE GLUCONATE 15 ML: 1.2 SOLUTION ORAL at 21:27

## 2021-10-22 ENCOUNTER — APPOINTMENT (INPATIENT)
Dept: RADIOLOGY | Facility: HOSPITAL | Age: 76
DRG: 871 | End: 2021-10-22
Payer: MEDICARE

## 2021-10-22 LAB
ANION GAP SERPL CALCULATED.3IONS-SCNC: 2 MMOL/L (ref 4–13)
ANION GAP SERPL CALCULATED.3IONS-SCNC: 3 MMOL/L (ref 4–13)
BUN SERPL-MCNC: 14 MG/DL (ref 5–25)
BUN SERPL-MCNC: 14 MG/DL (ref 5–25)
CALCIUM SERPL-MCNC: 7.6 MG/DL (ref 8.3–10.1)
CALCIUM SERPL-MCNC: 7.7 MG/DL (ref 8.3–10.1)
CHLORIDE SERPL-SCNC: 107 MMOL/L (ref 100–108)
CHLORIDE SERPL-SCNC: 108 MMOL/L (ref 100–108)
CO2 SERPL-SCNC: 31 MMOL/L (ref 21–32)
CO2 SERPL-SCNC: 32 MMOL/L (ref 21–32)
CREAT SERPL-MCNC: 0.89 MG/DL (ref 0.6–1.3)
CREAT SERPL-MCNC: 0.98 MG/DL (ref 0.6–1.3)
ERYTHROCYTE [DISTWIDTH] IN BLOOD BY AUTOMATED COUNT: 14.3 % (ref 11.6–15.1)
FLUAV RNA RESP QL NAA+PROBE: NEGATIVE
FLUBV RNA RESP QL NAA+PROBE: NEGATIVE
GFR SERPL CREATININE-BSD FRML MDRD: 75 ML/MIN/1.73SQ M
GFR SERPL CREATININE-BSD FRML MDRD: 84 ML/MIN/1.73SQ M
GLUCOSE SERPL-MCNC: 135 MG/DL (ref 65–140)
GLUCOSE SERPL-MCNC: 205 MG/DL (ref 65–140)
GLUCOSE SERPL-MCNC: 208 MG/DL (ref 65–140)
GLUCOSE SERPL-MCNC: 223 MG/DL (ref 65–140)
GLUCOSE SERPL-MCNC: 306 MG/DL (ref 65–140)
GLUCOSE SERPL-MCNC: 57 MG/DL (ref 65–140)
GLUCOSE SERPL-MCNC: >500 MG/DL (ref 65–140)
HCT VFR BLD AUTO: 26.9 % (ref 36.5–49.3)
HGB BLD-MCNC: 8.6 G/DL (ref 12–17)
LACTATE SERPL-SCNC: 2 MMOL/L (ref 0.5–2)
LACTATE SERPL-SCNC: 2.2 MMOL/L (ref 0.5–2)
MAGNESIUM SERPL-MCNC: 2.4 MG/DL (ref 1.6–2.6)
MCH RBC QN AUTO: 30.6 PG (ref 26.8–34.3)
MCHC RBC AUTO-ENTMCNC: 32 G/DL (ref 31.4–37.4)
MCV RBC AUTO: 96 FL (ref 82–98)
PLATELET # BLD AUTO: 223 THOUSANDS/UL (ref 149–390)
PMV BLD AUTO: 11.5 FL (ref 8.9–12.7)
POTASSIUM SERPL-SCNC: 1.7 MMOL/L (ref 3.5–5.3)
POTASSIUM SERPL-SCNC: 2.8 MMOL/L (ref 3.5–5.3)
POTASSIUM SERPL-SCNC: 3.1 MMOL/L (ref 3.5–5.3)
PROCALCITONIN SERPL-MCNC: 0.53 NG/ML
RBC # BLD AUTO: 2.81 MILLION/UL (ref 3.88–5.62)
RSV RNA RESP QL NAA+PROBE: NEGATIVE
SARS-COV-2 RNA RESP QL NAA+PROBE: NEGATIVE
SODIUM SERPL-SCNC: 141 MMOL/L (ref 136–145)
SODIUM SERPL-SCNC: 142 MMOL/L (ref 136–145)
WBC # BLD AUTO: 3.1 THOUSAND/UL (ref 4.31–10.16)

## 2021-10-22 PROCEDURE — 71045 X-RAY EXAM CHEST 1 VIEW: CPT

## 2021-10-22 PROCEDURE — 83605 ASSAY OF LACTIC ACID: CPT | Performed by: STUDENT IN AN ORGANIZED HEALTH CARE EDUCATION/TRAINING PROGRAM

## 2021-10-22 PROCEDURE — 0241U HB NFCT DS VIR RESP RNA 4 TRGT: CPT | Performed by: PHYSICIAN ASSISTANT

## 2021-10-22 PROCEDURE — 83735 ASSAY OF MAGNESIUM: CPT | Performed by: PHYSICIAN ASSISTANT

## 2021-10-22 PROCEDURE — 87040 BLOOD CULTURE FOR BACTERIA: CPT | Performed by: STUDENT IN AN ORGANIZED HEALTH CARE EDUCATION/TRAINING PROGRAM

## 2021-10-22 PROCEDURE — 94760 N-INVAS EAR/PLS OXIMETRY 1: CPT

## 2021-10-22 PROCEDURE — 84132 ASSAY OF SERUM POTASSIUM: CPT | Performed by: STUDENT IN AN ORGANIZED HEALTH CARE EDUCATION/TRAINING PROGRAM

## 2021-10-22 PROCEDURE — 84145 PROCALCITONIN (PCT): CPT | Performed by: STUDENT IN AN ORGANIZED HEALTH CARE EDUCATION/TRAINING PROGRAM

## 2021-10-22 PROCEDURE — 93005 ELECTROCARDIOGRAM TRACING: CPT

## 2021-10-22 PROCEDURE — 82948 REAGENT STRIP/BLOOD GLUCOSE: CPT

## 2021-10-22 PROCEDURE — 85027 COMPLETE CBC AUTOMATED: CPT | Performed by: STUDENT IN AN ORGANIZED HEALTH CARE EDUCATION/TRAINING PROGRAM

## 2021-10-22 PROCEDURE — 80048 BASIC METABOLIC PNL TOTAL CA: CPT | Performed by: STUDENT IN AN ORGANIZED HEALTH CARE EDUCATION/TRAINING PROGRAM

## 2021-10-22 PROCEDURE — 80048 BASIC METABOLIC PNL TOTAL CA: CPT | Performed by: PHYSICIAN ASSISTANT

## 2021-10-22 PROCEDURE — 97530 THERAPEUTIC ACTIVITIES: CPT

## 2021-10-22 PROCEDURE — 99233 SBSQ HOSP IP/OBS HIGH 50: CPT | Performed by: INTERNAL MEDICINE

## 2021-10-22 RX ORDER — POTASSIUM CHLORIDE 20 MEQ/1
40 TABLET, EXTENDED RELEASE ORAL ONCE
Status: DISCONTINUED | OUTPATIENT
Start: 2021-10-22 | End: 2021-10-22

## 2021-10-22 RX ORDER — DEXTROSE MONOHYDRATE 25 G/50ML
25 INJECTION, SOLUTION INTRAVENOUS ONCE
Status: COMPLETED | OUTPATIENT
Start: 2021-10-22 | End: 2021-10-22

## 2021-10-22 RX ORDER — FUROSEMIDE 10 MG/ML
40 INJECTION INTRAMUSCULAR; INTRAVENOUS DAILY
Status: DISCONTINUED | OUTPATIENT
Start: 2021-10-22 | End: 2021-10-23

## 2021-10-22 RX ORDER — POTASSIUM CHLORIDE 20 MEQ/1
20 TABLET, EXTENDED RELEASE ORAL ONCE
Status: COMPLETED | OUTPATIENT
Start: 2021-10-22 | End: 2021-10-22

## 2021-10-22 RX ORDER — POTASSIUM CHLORIDE 14.9 MG/ML
20 INJECTION INTRAVENOUS
Status: DISCONTINUED | OUTPATIENT
Start: 2021-10-22 | End: 2021-10-23

## 2021-10-22 RX ORDER — POTASSIUM CHLORIDE 14.9 MG/ML
20 INJECTION INTRAVENOUS
Status: COMPLETED | OUTPATIENT
Start: 2021-10-22 | End: 2021-10-23

## 2021-10-22 RX ORDER — MAGNESIUM SULFATE HEPTAHYDRATE 40 MG/ML
2 INJECTION, SOLUTION INTRAVENOUS ONCE
Status: COMPLETED | OUTPATIENT
Start: 2021-10-22 | End: 2021-10-23

## 2021-10-22 RX ADMIN — HEPARIN SODIUM 5000 UNITS: 5000 INJECTION INTRAVENOUS; SUBCUTANEOUS at 06:02

## 2021-10-22 RX ADMIN — POTASSIUM CHLORIDE 20 MEQ: 14.9 INJECTION, SOLUTION INTRAVENOUS at 11:34

## 2021-10-22 RX ADMIN — POTASSIUM CHLORIDE 20 MEQ: 1500 TABLET, EXTENDED RELEASE ORAL at 09:30

## 2021-10-22 RX ADMIN — MAGNESIUM SULFATE HEPTAHYDRATE 2 G: 40 INJECTION, SOLUTION INTRAVENOUS at 11:34

## 2021-10-22 RX ADMIN — ASPIRIN 81 MG CHEWABLE TABLET 81 MG: 81 TABLET CHEWABLE at 09:30

## 2021-10-22 RX ADMIN — SODIUM CHLORIDE 500 ML: 0.9 INJECTION, SOLUTION INTRAVENOUS at 14:38

## 2021-10-22 RX ADMIN — INSULIN LISPRO 2 UNITS: 100 INJECTION, SOLUTION INTRAVENOUS; SUBCUTANEOUS at 11:38

## 2021-10-22 RX ADMIN — LEVOTHYROXINE SODIUM 150 MCG: 75 TABLET ORAL at 06:02

## 2021-10-22 RX ADMIN — METRONIDAZOLE 500 MG: 500 INJECTION, SOLUTION INTRAVENOUS at 22:01

## 2021-10-22 RX ADMIN — POTASSIUM CHLORIDE 20 MEQ: 14.9 INJECTION, SOLUTION INTRAVENOUS at 14:37

## 2021-10-22 RX ADMIN — Medication 3 MG: at 21:59

## 2021-10-22 RX ADMIN — DEXTROSE MONOHYDRATE 25 ML: 500 INJECTION PARENTERAL at 22:19

## 2021-10-22 RX ADMIN — FUROSEMIDE 40 MG: 10 INJECTION, SOLUTION INTRAMUSCULAR; INTRAVENOUS at 09:29

## 2021-10-22 RX ADMIN — ATORVASTATIN CALCIUM 80 MG: 80 TABLET, FILM COATED ORAL at 16:53

## 2021-10-22 RX ADMIN — POTASSIUM CHLORIDE 20 MEQ: 14.9 INJECTION, SOLUTION INTRAVENOUS at 21:54

## 2021-10-22 RX ADMIN — CEFTRIAXONE SODIUM 1000 MG: 10 INJECTION, POWDER, FOR SOLUTION INTRAVENOUS at 16:53

## 2021-10-22 RX ADMIN — METRONIDAZOLE 500 MG: 500 INJECTION, SOLUTION INTRAVENOUS at 14:38

## 2021-10-22 RX ADMIN — CHLORHEXIDINE GLUCONATE 15 ML: 1.2 SOLUTION ORAL at 21:59

## 2021-10-22 RX ADMIN — CLOPIDOGREL BISULFATE 75 MG: 75 TABLET ORAL at 09:30

## 2021-10-22 RX ADMIN — INSULIN GLARGINE 14 UNITS: 100 INJECTION, SOLUTION SUBCUTANEOUS at 09:29

## 2021-10-22 RX ADMIN — INSULIN LISPRO 2 UNITS: 100 INJECTION, SOLUTION INTRAVENOUS; SUBCUTANEOUS at 09:31

## 2021-10-22 RX ADMIN — CHLORHEXIDINE GLUCONATE 15 ML: 1.2 SOLUTION ORAL at 09:29

## 2021-10-22 RX ADMIN — PANTOPRAZOLE SODIUM 20 MG: 20 TABLET, DELAYED RELEASE ORAL at 06:02

## 2021-10-22 RX ADMIN — POTASSIUM CHLORIDE 20 MEQ: 14.9 INJECTION, SOLUTION INTRAVENOUS at 09:30

## 2021-10-23 PROBLEM — E87.5 HYPERKALEMIA: Status: ACTIVE | Noted: 2021-10-23

## 2021-10-23 LAB
ANION GAP SERPL CALCULATED.3IONS-SCNC: 0 MMOL/L (ref 4–13)
ANION GAP SERPL CALCULATED.3IONS-SCNC: 5 MMOL/L (ref 4–13)
ANISOCYTOSIS BLD QL SMEAR: PRESENT
ARTIFACT: PRESENT
ATRIAL RATE: 103 BPM
BASOPHILS # BLD MANUAL: 0 THOUSAND/UL (ref 0–0.1)
BASOPHILS NFR MAR MANUAL: 0 % (ref 0–1)
BUN SERPL-MCNC: 15 MG/DL (ref 5–25)
BUN SERPL-MCNC: 15 MG/DL (ref 5–25)
CALCIUM SERPL-MCNC: 7.1 MG/DL (ref 8.3–10.1)
CALCIUM SERPL-MCNC: 8.1 MG/DL (ref 8.3–10.1)
CHLORIDE SERPL-SCNC: 104 MMOL/L (ref 100–108)
CHLORIDE SERPL-SCNC: 111 MMOL/L (ref 100–108)
CO2 SERPL-SCNC: 31 MMOL/L (ref 21–32)
CO2 SERPL-SCNC: 31 MMOL/L (ref 21–32)
CREAT SERPL-MCNC: 0.88 MG/DL (ref 0.6–1.3)
CREAT SERPL-MCNC: 0.95 MG/DL (ref 0.6–1.3)
EOSINOPHIL # BLD MANUAL: 0.04 THOUSAND/UL (ref 0–0.4)
EOSINOPHIL NFR BLD MANUAL: 1 % (ref 0–6)
ERYTHROCYTE [DISTWIDTH] IN BLOOD BY AUTOMATED COUNT: 14.4 % (ref 11.6–15.1)
GFR SERPL CREATININE-BSD FRML MDRD: 78 ML/MIN/1.73SQ M
GFR SERPL CREATININE-BSD FRML MDRD: 84 ML/MIN/1.73SQ M
GLUCOSE SERPL-MCNC: 117 MG/DL (ref 65–140)
GLUCOSE SERPL-MCNC: 118 MG/DL (ref 65–140)
GLUCOSE SERPL-MCNC: 201 MG/DL (ref 65–140)
GLUCOSE SERPL-MCNC: 249 MG/DL (ref 65–140)
GLUCOSE SERPL-MCNC: 252 MG/DL (ref 65–140)
GLUCOSE SERPL-MCNC: 263 MG/DL (ref 65–140)
GLUCOSE SERPL-MCNC: 272 MG/DL (ref 65–140)
HCT VFR BLD AUTO: 24.3 % (ref 36.5–49.3)
HGB BLD-MCNC: 7.8 G/DL (ref 12–17)
LYMPHOCYTES # BLD AUTO: 0.43 THOUSAND/UL (ref 0.6–4.47)
LYMPHOCYTES # BLD AUTO: 11 % (ref 14–44)
MAGNESIUM SERPL-MCNC: 2.3 MG/DL (ref 1.6–2.6)
MAGNESIUM SERPL-MCNC: 2.4 MG/DL (ref 1.6–2.6)
MCH RBC QN AUTO: 31 PG (ref 26.8–34.3)
MCHC RBC AUTO-ENTMCNC: 32.1 G/DL (ref 31.4–37.4)
MCV RBC AUTO: 96 FL (ref 82–98)
MONOCYTES # BLD AUTO: 0.2 THOUSAND/UL (ref 0–1.22)
MONOCYTES NFR BLD: 5 % (ref 4–12)
NEUTROPHILS # BLD MANUAL: 3.06 THOUSAND/UL (ref 1.85–7.62)
NEUTS BAND NFR BLD MANUAL: 48 % (ref 0–8)
NEUTS SEG NFR BLD AUTO: 30 % (ref 43–75)
NRBC BLD AUTO-RTO: 2 /100 WBC (ref 0–2)
P AXIS: 62 DEGREES
PLATELET # BLD AUTO: 216 THOUSANDS/UL (ref 149–390)
PLATELET BLD QL SMEAR: ADEQUATE
PMV BLD AUTO: 11.4 FL (ref 8.9–12.7)
POIKILOCYTOSIS BLD QL SMEAR: PRESENT
POTASSIUM SERPL-SCNC: 3.9 MMOL/L (ref 3.5–5.3)
POTASSIUM SERPL-SCNC: 5.5 MMOL/L (ref 3.5–5.3)
PR INTERVAL: 152 MS
PROCALCITONIN SERPL-MCNC: 0.22 NG/ML
QRS AXIS: -51 DEGREES
QRSD INTERVAL: 140 MS
QT INTERVAL: 408 MS
QTC INTERVAL: 534 MS
RBC # BLD AUTO: 2.52 MILLION/UL (ref 3.88–5.62)
RBC MORPH BLD: PRESENT
SODIUM SERPL-SCNC: 140 MMOL/L (ref 136–145)
SODIUM SERPL-SCNC: 142 MMOL/L (ref 136–145)
T WAVE AXIS: 29 DEGREES
VARIANT LYMPHS # BLD AUTO: 5 %
VENTRICULAR RATE: 103 BPM
WBC # BLD AUTO: 3.92 THOUSAND/UL (ref 4.31–10.16)

## 2021-10-23 PROCEDURE — 94760 N-INVAS EAR/PLS OXIMETRY 1: CPT

## 2021-10-23 PROCEDURE — 85007 BL SMEAR W/DIFF WBC COUNT: CPT | Performed by: PHYSICIAN ASSISTANT

## 2021-10-23 PROCEDURE — 84145 PROCALCITONIN (PCT): CPT | Performed by: PHYSICIAN ASSISTANT

## 2021-10-23 PROCEDURE — 80048 BASIC METABOLIC PNL TOTAL CA: CPT | Performed by: STUDENT IN AN ORGANIZED HEALTH CARE EDUCATION/TRAINING PROGRAM

## 2021-10-23 PROCEDURE — 83735 ASSAY OF MAGNESIUM: CPT | Performed by: PHYSICIAN ASSISTANT

## 2021-10-23 PROCEDURE — 85027 COMPLETE CBC AUTOMATED: CPT | Performed by: PHYSICIAN ASSISTANT

## 2021-10-23 PROCEDURE — 80048 BASIC METABOLIC PNL TOTAL CA: CPT | Performed by: PHYSICIAN ASSISTANT

## 2021-10-23 PROCEDURE — 99233 SBSQ HOSP IP/OBS HIGH 50: CPT | Performed by: INTERNAL MEDICINE

## 2021-10-23 PROCEDURE — 82948 REAGENT STRIP/BLOOD GLUCOSE: CPT

## 2021-10-23 PROCEDURE — 93010 ELECTROCARDIOGRAM REPORT: CPT | Performed by: INTERNAL MEDICINE

## 2021-10-23 PROCEDURE — 83735 ASSAY OF MAGNESIUM: CPT | Performed by: STUDENT IN AN ORGANIZED HEALTH CARE EDUCATION/TRAINING PROGRAM

## 2021-10-23 RX ORDER — CALCIUM GLUCONATE 20 MG/ML
1 INJECTION, SOLUTION INTRAVENOUS ONCE
Status: COMPLETED | OUTPATIENT
Start: 2021-10-23 | End: 2021-10-23

## 2021-10-23 RX ORDER — FUROSEMIDE 10 MG/ML
20 INJECTION INTRAMUSCULAR; INTRAVENOUS DAILY
Status: DISCONTINUED | OUTPATIENT
Start: 2021-10-24 | End: 2021-10-23

## 2021-10-23 RX ORDER — CALCIUM GLUCONATE 20 MG/ML
1 INJECTION, SOLUTION INTRAVENOUS ONCE
Status: DISCONTINUED | OUTPATIENT
Start: 2021-10-23 | End: 2021-10-23

## 2021-10-23 RX ORDER — FUROSEMIDE 20 MG/1
20 TABLET ORAL DAILY
Status: DISCONTINUED | OUTPATIENT
Start: 2021-10-24 | End: 2021-10-24

## 2021-10-23 RX ORDER — DEXTROSE MONOHYDRATE 25 G/50ML
25 INJECTION, SOLUTION INTRAVENOUS ONCE
Status: DISCONTINUED | OUTPATIENT
Start: 2021-10-23 | End: 2021-10-23

## 2021-10-23 RX ADMIN — CHLORHEXIDINE GLUCONATE 15 ML: 1.2 SOLUTION ORAL at 09:45

## 2021-10-23 RX ADMIN — METRONIDAZOLE 500 MG: 500 INJECTION, SOLUTION INTRAVENOUS at 12:49

## 2021-10-23 RX ADMIN — FUROSEMIDE 40 MG: 10 INJECTION, SOLUTION INTRAMUSCULAR; INTRAVENOUS at 05:48

## 2021-10-23 RX ADMIN — PANTOPRAZOLE SODIUM 20 MG: 20 TABLET, DELAYED RELEASE ORAL at 05:46

## 2021-10-23 RX ADMIN — POTASSIUM CHLORIDE 20 MEQ: 14.9 INJECTION, SOLUTION INTRAVENOUS at 00:37

## 2021-10-23 RX ADMIN — CEFTRIAXONE SODIUM 1000 MG: 10 INJECTION, POWDER, FOR SOLUTION INTRAVENOUS at 13:37

## 2021-10-23 RX ADMIN — METRONIDAZOLE 500 MG: 500 INJECTION, SOLUTION INTRAVENOUS at 04:00

## 2021-10-23 RX ADMIN — INSULIN LISPRO 3 UNITS: 100 INJECTION, SOLUTION INTRAVENOUS; SUBCUTANEOUS at 12:36

## 2021-10-23 RX ADMIN — INSULIN GLARGINE 14 UNITS: 100 INJECTION, SOLUTION SUBCUTANEOUS at 10:08

## 2021-10-23 RX ADMIN — ATORVASTATIN CALCIUM 80 MG: 80 TABLET, FILM COATED ORAL at 18:48

## 2021-10-23 RX ADMIN — CHLORHEXIDINE GLUCONATE 15 ML: 1.2 SOLUTION ORAL at 22:16

## 2021-10-23 RX ADMIN — LEVOTHYROXINE SODIUM 150 MCG: 75 TABLET ORAL at 05:46

## 2021-10-23 RX ADMIN — INSULIN LISPRO 4 UNITS: 100 INJECTION, SOLUTION INTRAVENOUS; SUBCUTANEOUS at 09:47

## 2021-10-23 RX ADMIN — ASPIRIN 81 MG CHEWABLE TABLET 81 MG: 81 TABLET CHEWABLE at 09:45

## 2021-10-23 RX ADMIN — HEPARIN SODIUM 5000 UNITS: 5000 INJECTION INTRAVENOUS; SUBCUTANEOUS at 15:08

## 2021-10-23 RX ADMIN — HEPARIN SODIUM 5000 UNITS: 5000 INJECTION INTRAVENOUS; SUBCUTANEOUS at 22:16

## 2021-10-23 RX ADMIN — METRONIDAZOLE 500 MG: 500 INJECTION, SOLUTION INTRAVENOUS at 22:16

## 2021-10-23 RX ADMIN — CLOPIDOGREL BISULFATE 75 MG: 75 TABLET ORAL at 09:45

## 2021-10-23 RX ADMIN — INSULIN LISPRO 2 UNITS: 100 INJECTION, SOLUTION INTRAVENOUS; SUBCUTANEOUS at 22:26

## 2021-10-23 RX ADMIN — CALCIUM GLUCONATE 1 G: 20 INJECTION, SOLUTION INTRAVENOUS at 05:49

## 2021-10-23 RX ADMIN — Medication 3 MG: at 22:16

## 2021-10-24 ENCOUNTER — APPOINTMENT (INPATIENT)
Dept: RADIOLOGY | Facility: HOSPITAL | Age: 76
DRG: 871 | End: 2021-10-24
Payer: MEDICARE

## 2021-10-24 PROBLEM — I95.9 HYPOTENSION: Status: ACTIVE | Noted: 2021-10-24

## 2021-10-24 LAB
ANION GAP SERPL CALCULATED.3IONS-SCNC: 3 MMOL/L (ref 4–13)
BUN SERPL-MCNC: 15 MG/DL (ref 5–25)
CALCIUM SERPL-MCNC: 7.6 MG/DL (ref 8.3–10.1)
CHLORIDE SERPL-SCNC: 106 MMOL/L (ref 100–108)
CO2 SERPL-SCNC: 31 MMOL/L (ref 21–32)
CREAT SERPL-MCNC: 0.94 MG/DL (ref 0.6–1.3)
ERYTHROCYTE [DISTWIDTH] IN BLOOD BY AUTOMATED COUNT: 14.6 % (ref 11.6–15.1)
GFR SERPL CREATININE-BSD FRML MDRD: 79 ML/MIN/1.73SQ M
GLUCOSE SERPL-MCNC: 142 MG/DL (ref 65–140)
GLUCOSE SERPL-MCNC: 173 MG/DL (ref 65–140)
GLUCOSE SERPL-MCNC: 196 MG/DL (ref 65–140)
GLUCOSE SERPL-MCNC: 260 MG/DL (ref 65–140)
GLUCOSE SERPL-MCNC: 336 MG/DL (ref 65–140)
GLUCOSE SERPL-MCNC: 62 MG/DL (ref 65–140)
GLUCOSE SERPL-MCNC: 90 MG/DL (ref 65–140)
HCT VFR BLD AUTO: 27.3 % (ref 36.5–49.3)
HGB BLD-MCNC: 8.7 G/DL (ref 12–17)
MCH RBC QN AUTO: 31 PG (ref 26.8–34.3)
MCHC RBC AUTO-ENTMCNC: 31.9 G/DL (ref 31.4–37.4)
MCV RBC AUTO: 97 FL (ref 82–98)
PLATELET # BLD AUTO: 237 THOUSANDS/UL (ref 149–390)
PMV BLD AUTO: 11.4 FL (ref 8.9–12.7)
POTASSIUM SERPL-SCNC: 3.5 MMOL/L (ref 3.5–5.3)
RBC # BLD AUTO: 2.81 MILLION/UL (ref 3.88–5.62)
SODIUM SERPL-SCNC: 140 MMOL/L (ref 136–145)
WBC # BLD AUTO: 4.78 THOUSAND/UL (ref 4.31–10.16)

## 2021-10-24 PROCEDURE — G1004 CDSM NDSC: HCPCS

## 2021-10-24 PROCEDURE — 82948 REAGENT STRIP/BLOOD GLUCOSE: CPT

## 2021-10-24 PROCEDURE — 99233 SBSQ HOSP IP/OBS HIGH 50: CPT | Performed by: INTERNAL MEDICINE

## 2021-10-24 PROCEDURE — 85027 COMPLETE CBC AUTOMATED: CPT | Performed by: STUDENT IN AN ORGANIZED HEALTH CARE EDUCATION/TRAINING PROGRAM

## 2021-10-24 PROCEDURE — 99223 1ST HOSP IP/OBS HIGH 75: CPT | Performed by: INTERNAL MEDICINE

## 2021-10-24 PROCEDURE — 74177 CT ABD & PELVIS W/CONTRAST: CPT

## 2021-10-24 PROCEDURE — 99222 1ST HOSP IP/OBS MODERATE 55: CPT | Performed by: SURGERY

## 2021-10-24 PROCEDURE — 80048 BASIC METABOLIC PNL TOTAL CA: CPT | Performed by: STUDENT IN AN ORGANIZED HEALTH CARE EDUCATION/TRAINING PROGRAM

## 2021-10-24 PROCEDURE — 71260 CT THORAX DX C+: CPT

## 2021-10-24 PROCEDURE — 94760 N-INVAS EAR/PLS OXIMETRY 1: CPT

## 2021-10-24 RX ORDER — DEXTROSE MONOHYDRATE 25 G/50ML
INJECTION, SOLUTION INTRAVENOUS
Status: COMPLETED
Start: 2021-10-24 | End: 2021-10-24

## 2021-10-24 RX ORDER — ALBUMIN (HUMAN) 12.5 G/50ML
25 SOLUTION INTRAVENOUS
Status: COMPLETED | OUTPATIENT
Start: 2021-10-24 | End: 2021-10-24

## 2021-10-24 RX ORDER — POTASSIUM CHLORIDE 20 MEQ/1
40 TABLET, EXTENDED RELEASE ORAL ONCE
Status: COMPLETED | OUTPATIENT
Start: 2021-10-24 | End: 2021-10-24

## 2021-10-24 RX ADMIN — LEVOTHYROXINE SODIUM 150 MCG: 75 TABLET ORAL at 05:59

## 2021-10-24 RX ADMIN — ASPIRIN 81 MG CHEWABLE TABLET 81 MG: 81 TABLET CHEWABLE at 09:49

## 2021-10-24 RX ADMIN — PANTOPRAZOLE SODIUM 20 MG: 20 TABLET, DELAYED RELEASE ORAL at 06:00

## 2021-10-24 RX ADMIN — INSULIN GLARGINE 14 UNITS: 100 INJECTION, SOLUTION SUBCUTANEOUS at 10:09

## 2021-10-24 RX ADMIN — Medication 3 MG: at 21:54

## 2021-10-24 RX ADMIN — CEFTRIAXONE SODIUM 1000 MG: 10 INJECTION, POWDER, FOR SOLUTION INTRAVENOUS at 16:04

## 2021-10-24 RX ADMIN — ATORVASTATIN CALCIUM 80 MG: 80 TABLET, FILM COATED ORAL at 18:24

## 2021-10-24 RX ADMIN — HEPARIN SODIUM 5000 UNITS: 5000 INJECTION INTRAVENOUS; SUBCUTANEOUS at 06:00

## 2021-10-24 RX ADMIN — HEPARIN SODIUM 5000 UNITS: 5000 INJECTION INTRAVENOUS; SUBCUTANEOUS at 13:10

## 2021-10-24 RX ADMIN — CLOPIDOGREL BISULFATE 75 MG: 75 TABLET ORAL at 09:49

## 2021-10-24 RX ADMIN — CHLORHEXIDINE GLUCONATE 15 ML: 1.2 SOLUTION ORAL at 21:54

## 2021-10-24 RX ADMIN — METRONIDAZOLE 500 MG: 500 INJECTION, SOLUTION INTRAVENOUS at 05:59

## 2021-10-24 RX ADMIN — METRONIDAZOLE 500 MG: 500 INJECTION, SOLUTION INTRAVENOUS at 21:55

## 2021-10-24 RX ADMIN — INSULIN LISPRO 1 UNITS: 100 INJECTION, SOLUTION INTRAVENOUS; SUBCUTANEOUS at 09:49

## 2021-10-24 RX ADMIN — ALBUMIN (HUMAN) 25 G: 0.25 INJECTION, SOLUTION INTRAVENOUS at 18:12

## 2021-10-24 RX ADMIN — CHLORHEXIDINE GLUCONATE 15 ML: 1.2 SOLUTION ORAL at 09:49

## 2021-10-24 RX ADMIN — HEPARIN SODIUM 5000 UNITS: 5000 INJECTION INTRAVENOUS; SUBCUTANEOUS at 21:55

## 2021-10-24 RX ADMIN — METRONIDAZOLE 500 MG: 500 INJECTION, SOLUTION INTRAVENOUS at 13:09

## 2021-10-24 RX ADMIN — DEXTROSE MONOHYDRATE 25 ML: 25 INJECTION, SOLUTION INTRAVENOUS at 18:21

## 2021-10-24 RX ADMIN — IOHEXOL 100 ML: 350 INJECTION, SOLUTION INTRAVENOUS at 12:23

## 2021-10-24 RX ADMIN — ALBUMIN (HUMAN) 25 G: 0.25 INJECTION, SOLUTION INTRAVENOUS at 19:58

## 2021-10-24 RX ADMIN — SODIUM CHLORIDE 1000 ML: 0.9 INJECTION, SOLUTION INTRAVENOUS at 13:00

## 2021-10-24 RX ADMIN — INSULIN LISPRO 3 UNITS: 100 INJECTION, SOLUTION INTRAVENOUS; SUBCUTANEOUS at 13:04

## 2021-10-24 RX ADMIN — SODIUM CHLORIDE 250 ML: 0.9 INJECTION, SOLUTION INTRAVENOUS at 09:57

## 2021-10-24 RX ADMIN — POTASSIUM CHLORIDE 40 MEQ: 1500 TABLET, EXTENDED RELEASE ORAL at 09:49

## 2021-10-25 PROBLEM — N17.9 AKI (ACUTE KIDNEY INJURY) (HCC): Status: RESOLVED | Noted: 2021-10-12 | Resolved: 2021-10-25

## 2021-10-25 PROBLEM — G93.41 ACUTE METABOLIC ENCEPHALOPATHY: Status: RESOLVED | Noted: 2021-10-15 | Resolved: 2021-10-25

## 2021-10-25 PROBLEM — E87.5 HYPERKALEMIA: Status: RESOLVED | Noted: 2021-10-23 | Resolved: 2021-10-25

## 2021-10-25 LAB
ANION GAP SERPL CALCULATED.3IONS-SCNC: 5 MMOL/L (ref 4–13)
BUN SERPL-MCNC: 13 MG/DL (ref 5–25)
CALCIUM SERPL-MCNC: 7.8 MG/DL (ref 8.3–10.1)
CHLORIDE SERPL-SCNC: 108 MMOL/L (ref 100–108)
CO2 SERPL-SCNC: 29 MMOL/L (ref 21–32)
CREAT SERPL-MCNC: 0.95 MG/DL (ref 0.6–1.3)
ERYTHROCYTE [DISTWIDTH] IN BLOOD BY AUTOMATED COUNT: 14.8 % (ref 11.6–15.1)
GFR SERPL CREATININE-BSD FRML MDRD: 78 ML/MIN/1.73SQ M
GLUCOSE SERPL-MCNC: 105 MG/DL (ref 65–140)
GLUCOSE SERPL-MCNC: 108 MG/DL (ref 65–140)
GLUCOSE SERPL-MCNC: 116 MG/DL (ref 65–140)
GLUCOSE SERPL-MCNC: 174 MG/DL (ref 65–140)
GLUCOSE SERPL-MCNC: 230 MG/DL (ref 65–140)
GLUCOSE SERPL-MCNC: 243 MG/DL (ref 65–140)
HCT VFR BLD AUTO: 23.3 % (ref 36.5–49.3)
HGB BLD-MCNC: 7.3 G/DL (ref 12–17)
MCH RBC QN AUTO: 30.4 PG (ref 26.8–34.3)
MCHC RBC AUTO-ENTMCNC: 31.3 G/DL (ref 31.4–37.4)
MCV RBC AUTO: 97 FL (ref 82–98)
NRBC BLD AUTO-RTO: 1 /100 WBCS
PLATELET # BLD AUTO: 149 THOUSANDS/UL (ref 149–390)
PMV BLD AUTO: 11.5 FL (ref 8.9–12.7)
POTASSIUM SERPL-SCNC: 3.5 MMOL/L (ref 3.5–5.3)
RBC # BLD AUTO: 2.4 MILLION/UL (ref 3.88–5.62)
SODIUM SERPL-SCNC: 142 MMOL/L (ref 136–145)
WBC # BLD AUTO: 4.45 THOUSAND/UL (ref 4.31–10.16)

## 2021-10-25 PROCEDURE — 99223 1ST HOSP IP/OBS HIGH 75: CPT | Performed by: INTERNAL MEDICINE

## 2021-10-25 PROCEDURE — 85027 COMPLETE CBC AUTOMATED: CPT | Performed by: STUDENT IN AN ORGANIZED HEALTH CARE EDUCATION/TRAINING PROGRAM

## 2021-10-25 PROCEDURE — 99233 SBSQ HOSP IP/OBS HIGH 50: CPT | Performed by: INTERNAL MEDICINE

## 2021-10-25 PROCEDURE — 99232 SBSQ HOSP IP/OBS MODERATE 35: CPT | Performed by: PODIATRIST

## 2021-10-25 PROCEDURE — 94760 N-INVAS EAR/PLS OXIMETRY 1: CPT

## 2021-10-25 PROCEDURE — 80048 BASIC METABOLIC PNL TOTAL CA: CPT | Performed by: STUDENT IN AN ORGANIZED HEALTH CARE EDUCATION/TRAINING PROGRAM

## 2021-10-25 PROCEDURE — 87493 C DIFF AMPLIFIED PROBE: CPT | Performed by: INTERNAL MEDICINE

## 2021-10-25 PROCEDURE — 99232 SBSQ HOSP IP/OBS MODERATE 35: CPT | Performed by: STUDENT IN AN ORGANIZED HEALTH CARE EDUCATION/TRAINING PROGRAM

## 2021-10-25 PROCEDURE — 82948 REAGENT STRIP/BLOOD GLUCOSE: CPT

## 2021-10-25 RX ORDER — MIDODRINE HYDROCHLORIDE 5 MG/1
2.5 TABLET ORAL
Status: DISCONTINUED | OUTPATIENT
Start: 2021-10-25 | End: 2021-10-28

## 2021-10-25 RX ADMIN — CHLORHEXIDINE GLUCONATE 15 ML: 1.2 SOLUTION ORAL at 09:41

## 2021-10-25 RX ADMIN — METRONIDAZOLE 500 MG: 500 INJECTION, SOLUTION INTRAVENOUS at 13:39

## 2021-10-25 RX ADMIN — CEFTRIAXONE SODIUM 1000 MG: 10 INJECTION, POWDER, FOR SOLUTION INTRAVENOUS at 14:02

## 2021-10-25 RX ADMIN — CLOPIDOGREL BISULFATE 75 MG: 75 TABLET ORAL at 09:42

## 2021-10-25 RX ADMIN — Medication 125 MG: at 18:29

## 2021-10-25 RX ADMIN — PANTOPRAZOLE SODIUM 20 MG: 20 TABLET, DELAYED RELEASE ORAL at 05:44

## 2021-10-25 RX ADMIN — LEVOTHYROXINE SODIUM 150 MCG: 75 TABLET ORAL at 05:44

## 2021-10-25 RX ADMIN — HEPARIN SODIUM 5000 UNITS: 5000 INJECTION INTRAVENOUS; SUBCUTANEOUS at 22:08

## 2021-10-25 RX ADMIN — HEPARIN SODIUM 5000 UNITS: 5000 INJECTION INTRAVENOUS; SUBCUTANEOUS at 05:44

## 2021-10-25 RX ADMIN — INSULIN LISPRO 1 UNITS: 100 INJECTION, SOLUTION INTRAVENOUS; SUBCUTANEOUS at 22:15

## 2021-10-25 RX ADMIN — INSULIN LISPRO 3 UNITS: 100 INJECTION, SOLUTION INTRAVENOUS; SUBCUTANEOUS at 12:30

## 2021-10-25 RX ADMIN — INSULIN LISPRO 3 UNITS: 100 INJECTION, SOLUTION INTRAVENOUS; SUBCUTANEOUS at 17:14

## 2021-10-25 RX ADMIN — ASPIRIN 81 MG CHEWABLE TABLET 81 MG: 81 TABLET CHEWABLE at 09:41

## 2021-10-25 RX ADMIN — METRONIDAZOLE 500 MG: 500 INJECTION, SOLUTION INTRAVENOUS at 05:44

## 2021-10-25 RX ADMIN — HEPARIN SODIUM 5000 UNITS: 5000 INJECTION INTRAVENOUS; SUBCUTANEOUS at 14:02

## 2021-10-25 RX ADMIN — ACETAMINOPHEN 650 MG: 650 SUSPENSION ORAL at 09:41

## 2021-10-25 RX ADMIN — CHLORHEXIDINE GLUCONATE 15 ML: 1.2 SOLUTION ORAL at 22:00

## 2021-10-25 RX ADMIN — MIDODRINE HYDROCHLORIDE 2.5 MG: 5 TABLET ORAL at 17:13

## 2021-10-25 RX ADMIN — INSULIN GLARGINE 14 UNITS: 100 INJECTION, SOLUTION SUBCUTANEOUS at 11:03

## 2021-10-25 RX ADMIN — Medication 3 MG: at 22:08

## 2021-10-25 RX ADMIN — ATORVASTATIN CALCIUM 80 MG: 80 TABLET, FILM COATED ORAL at 17:13

## 2021-10-25 RX ADMIN — MIDODRINE HYDROCHLORIDE 2.5 MG: 5 TABLET ORAL at 12:25

## 2021-10-26 LAB
ANION GAP SERPL CALCULATED.3IONS-SCNC: 5 MMOL/L (ref 4–13)
BASOPHILS # BLD AUTO: 0.03 THOUSANDS/ΜL (ref 0–0.1)
BASOPHILS NFR BLD AUTO: 1 % (ref 0–1)
BUN SERPL-MCNC: 14 MG/DL (ref 5–25)
C DIFF TOX GENS STL QL NAA+PROBE: NEGATIVE
CALCIUM SERPL-MCNC: 8 MG/DL (ref 8.3–10.1)
CHLORIDE SERPL-SCNC: 109 MMOL/L (ref 100–108)
CO2 SERPL-SCNC: 29 MMOL/L (ref 21–32)
CREAT SERPL-MCNC: 1.19 MG/DL (ref 0.6–1.3)
EOSINOPHIL # BLD AUTO: 0.05 THOUSAND/ΜL (ref 0–0.61)
EOSINOPHIL NFR BLD AUTO: 1 % (ref 0–6)
ERYTHROCYTE [DISTWIDTH] IN BLOOD BY AUTOMATED COUNT: 15.5 % (ref 11.6–15.1)
GFR SERPL CREATININE-BSD FRML MDRD: 59 ML/MIN/1.73SQ M
GLUCOSE SERPL-MCNC: 119 MG/DL (ref 65–140)
GLUCOSE SERPL-MCNC: 146 MG/DL (ref 65–140)
GLUCOSE SERPL-MCNC: 190 MG/DL (ref 65–140)
GLUCOSE SERPL-MCNC: 216 MG/DL (ref 65–140)
GLUCOSE SERPL-MCNC: 247 MG/DL (ref 65–140)
HCT VFR BLD AUTO: 25.9 % (ref 36.5–49.3)
HGB BLD-MCNC: 8 G/DL (ref 12–17)
IMM GRANULOCYTES # BLD AUTO: 0.08 THOUSAND/UL (ref 0–0.2)
IMM GRANULOCYTES NFR BLD AUTO: 1 % (ref 0–2)
LYMPHOCYTES # BLD AUTO: 1.37 THOUSANDS/ΜL (ref 0.6–4.47)
LYMPHOCYTES NFR BLD AUTO: 21 % (ref 14–44)
MCH RBC QN AUTO: 31 PG (ref 26.8–34.3)
MCHC RBC AUTO-ENTMCNC: 30.9 G/DL (ref 31.4–37.4)
MCV RBC AUTO: 100 FL (ref 82–98)
MONOCYTES # BLD AUTO: 0.62 THOUSAND/ΜL (ref 0.17–1.22)
MONOCYTES NFR BLD AUTO: 10 % (ref 4–12)
NEUTROPHILS # BLD AUTO: 4.25 THOUSANDS/ΜL (ref 1.85–7.62)
NEUTS SEG NFR BLD AUTO: 66 % (ref 43–75)
NRBC BLD AUTO-RTO: 1 /100 WBCS
PLATELET # BLD AUTO: 160 THOUSANDS/UL (ref 149–390)
PMV BLD AUTO: 12.4 FL (ref 8.9–12.7)
POTASSIUM SERPL-SCNC: 3.6 MMOL/L (ref 3.5–5.3)
RBC # BLD AUTO: 2.58 MILLION/UL (ref 3.88–5.62)
SODIUM SERPL-SCNC: 143 MMOL/L (ref 136–145)
WBC # BLD AUTO: 6.4 THOUSAND/UL (ref 4.31–10.16)

## 2021-10-26 PROCEDURE — 99232 SBSQ HOSP IP/OBS MODERATE 35: CPT | Performed by: INTERNAL MEDICINE

## 2021-10-26 PROCEDURE — 82948 REAGENT STRIP/BLOOD GLUCOSE: CPT

## 2021-10-26 PROCEDURE — 92526 ORAL FUNCTION THERAPY: CPT

## 2021-10-26 PROCEDURE — 80048 BASIC METABOLIC PNL TOTAL CA: CPT | Performed by: INTERNAL MEDICINE

## 2021-10-26 PROCEDURE — 97110 THERAPEUTIC EXERCISES: CPT

## 2021-10-26 PROCEDURE — 85025 COMPLETE CBC W/AUTO DIFF WBC: CPT | Performed by: INTERNAL MEDICINE

## 2021-10-26 RX ORDER — FAMOTIDINE 40 MG/5ML
20 POWDER, FOR SUSPENSION ORAL 2 TIMES DAILY
Status: DISCONTINUED | OUTPATIENT
Start: 2021-10-26 | End: 2021-10-29 | Stop reason: HOSPADM

## 2021-10-26 RX ADMIN — FAMOTIDINE 20 MG: 40 POWDER, FOR SUSPENSION ORAL at 17:25

## 2021-10-26 RX ADMIN — MIDODRINE HYDROCHLORIDE 2.5 MG: 5 TABLET ORAL at 08:28

## 2021-10-26 RX ADMIN — INSULIN LISPRO 2 UNITS: 100 INJECTION, SOLUTION INTRAVENOUS; SUBCUTANEOUS at 12:37

## 2021-10-26 RX ADMIN — ATORVASTATIN CALCIUM 80 MG: 80 TABLET, FILM COATED ORAL at 17:22

## 2021-10-26 RX ADMIN — ASPIRIN 81 MG CHEWABLE TABLET 81 MG: 81 TABLET CHEWABLE at 08:28

## 2021-10-26 RX ADMIN — MIDODRINE HYDROCHLORIDE 2.5 MG: 5 TABLET ORAL at 12:36

## 2021-10-26 RX ADMIN — HEPARIN SODIUM 5000 UNITS: 5000 INJECTION INTRAVENOUS; SUBCUTANEOUS at 23:11

## 2021-10-26 RX ADMIN — HEPARIN SODIUM 5000 UNITS: 5000 INJECTION INTRAVENOUS; SUBCUTANEOUS at 13:46

## 2021-10-26 RX ADMIN — CHLORHEXIDINE GLUCONATE 15 ML: 1.2 SOLUTION ORAL at 23:11

## 2021-10-26 RX ADMIN — INSULIN LISPRO 2 UNITS: 100 INJECTION, SOLUTION INTRAVENOUS; SUBCUTANEOUS at 17:24

## 2021-10-26 RX ADMIN — CHLORHEXIDINE GLUCONATE 15 ML: 1.2 SOLUTION ORAL at 08:27

## 2021-10-26 RX ADMIN — CLOPIDOGREL BISULFATE 75 MG: 75 TABLET ORAL at 08:27

## 2021-10-26 RX ADMIN — MIDODRINE HYDROCHLORIDE 2.5 MG: 5 TABLET ORAL at 17:22

## 2021-10-26 RX ADMIN — INSULIN GLARGINE 14 UNITS: 100 INJECTION, SOLUTION SUBCUTANEOUS at 08:52

## 2021-10-26 RX ADMIN — LEVOTHYROXINE SODIUM 150 MCG: 75 TABLET ORAL at 06:13

## 2021-10-26 RX ADMIN — HEPARIN SODIUM 5000 UNITS: 5000 INJECTION INTRAVENOUS; SUBCUTANEOUS at 06:13

## 2021-10-26 RX ADMIN — Medication 125 MG: at 00:35

## 2021-10-26 RX ADMIN — Medication 3 MG: at 23:11

## 2021-10-27 LAB
ANION GAP SERPL CALCULATED.3IONS-SCNC: 4 MMOL/L (ref 4–13)
BACTERIA BLD CULT: NORMAL
BACTERIA BLD CULT: NORMAL
BUN SERPL-MCNC: 12 MG/DL (ref 5–25)
CALCIUM SERPL-MCNC: 8 MG/DL (ref 8.3–10.1)
CHLORIDE SERPL-SCNC: 108 MMOL/L (ref 100–108)
CO2 SERPL-SCNC: 30 MMOL/L (ref 21–32)
CREAT SERPL-MCNC: 1.03 MG/DL (ref 0.6–1.3)
ERYTHROCYTE [DISTWIDTH] IN BLOOD BY AUTOMATED COUNT: 15.8 % (ref 11.6–15.1)
GFR SERPL CREATININE-BSD FRML MDRD: 71 ML/MIN/1.73SQ M
GLUCOSE SERPL-MCNC: 113 MG/DL (ref 65–140)
GLUCOSE SERPL-MCNC: 118 MG/DL (ref 65–140)
GLUCOSE SERPL-MCNC: 174 MG/DL (ref 65–140)
GLUCOSE SERPL-MCNC: 178 MG/DL (ref 65–140)
GLUCOSE SERPL-MCNC: 192 MG/DL (ref 65–140)
HCT VFR BLD AUTO: 28 % (ref 36.5–49.3)
HGB BLD-MCNC: 8.7 G/DL (ref 12–17)
MCH RBC QN AUTO: 30.6 PG (ref 26.8–34.3)
MCHC RBC AUTO-ENTMCNC: 31.1 G/DL (ref 31.4–37.4)
MCV RBC AUTO: 99 FL (ref 82–98)
PLATELET # BLD AUTO: 256 THOUSANDS/UL (ref 149–390)
PMV BLD AUTO: 11.2 FL (ref 8.9–12.7)
POTASSIUM SERPL-SCNC: 3.4 MMOL/L (ref 3.5–5.3)
PSA SERPL-MCNC: 2.5 NG/ML (ref 0–4)
RBC # BLD AUTO: 2.84 MILLION/UL (ref 3.88–5.62)
SODIUM SERPL-SCNC: 142 MMOL/L (ref 136–145)
WBC # BLD AUTO: 5.99 THOUSAND/UL (ref 4.31–10.16)

## 2021-10-27 PROCEDURE — 84153 ASSAY OF PSA TOTAL: CPT | Performed by: PHYSICIAN ASSISTANT

## 2021-10-27 PROCEDURE — 80048 BASIC METABOLIC PNL TOTAL CA: CPT | Performed by: STUDENT IN AN ORGANIZED HEALTH CARE EDUCATION/TRAINING PROGRAM

## 2021-10-27 PROCEDURE — 99232 SBSQ HOSP IP/OBS MODERATE 35: CPT | Performed by: INTERNAL MEDICINE

## 2021-10-27 PROCEDURE — 99222 1ST HOSP IP/OBS MODERATE 55: CPT | Performed by: UROLOGY

## 2021-10-27 PROCEDURE — 82948 REAGENT STRIP/BLOOD GLUCOSE: CPT

## 2021-10-27 PROCEDURE — 85027 COMPLETE CBC AUTOMATED: CPT | Performed by: STUDENT IN AN ORGANIZED HEALTH CARE EDUCATION/TRAINING PROGRAM

## 2021-10-27 RX ORDER — ALBUMIN (HUMAN) 12.5 G/50ML
12.5 SOLUTION INTRAVENOUS 3 TIMES DAILY
Status: COMPLETED | OUTPATIENT
Start: 2021-10-27 | End: 2021-10-28

## 2021-10-27 RX ADMIN — INSULIN GLARGINE 14 UNITS: 100 INJECTION, SOLUTION SUBCUTANEOUS at 08:26

## 2021-10-27 RX ADMIN — ALBUMIN (HUMAN) 12.5 G: 0.25 INJECTION, SOLUTION INTRAVENOUS at 13:36

## 2021-10-27 RX ADMIN — ALBUMIN (HUMAN) 12.5 G: 0.25 INJECTION, SOLUTION INTRAVENOUS at 21:16

## 2021-10-27 RX ADMIN — ASPIRIN 81 MG CHEWABLE TABLET 81 MG: 81 TABLET CHEWABLE at 08:11

## 2021-10-27 RX ADMIN — CLOPIDOGREL BISULFATE 75 MG: 75 TABLET ORAL at 08:11

## 2021-10-27 RX ADMIN — INSULIN LISPRO 2 UNITS: 100 INJECTION, SOLUTION INTRAVENOUS; SUBCUTANEOUS at 11:41

## 2021-10-27 RX ADMIN — HEPARIN SODIUM 5000 UNITS: 5000 INJECTION INTRAVENOUS; SUBCUTANEOUS at 13:36

## 2021-10-27 RX ADMIN — LEVOTHYROXINE SODIUM 150 MCG: 75 TABLET ORAL at 06:15

## 2021-10-27 RX ADMIN — MIDODRINE HYDROCHLORIDE 2.5 MG: 5 TABLET ORAL at 08:11

## 2021-10-27 RX ADMIN — Medication 3 MG: at 21:15

## 2021-10-27 RX ADMIN — HEPARIN SODIUM 5000 UNITS: 5000 INJECTION INTRAVENOUS; SUBCUTANEOUS at 21:15

## 2021-10-27 RX ADMIN — MIDODRINE HYDROCHLORIDE 2.5 MG: 5 TABLET ORAL at 17:09

## 2021-10-27 RX ADMIN — HEPARIN SODIUM 5000 UNITS: 5000 INJECTION INTRAVENOUS; SUBCUTANEOUS at 06:15

## 2021-10-27 RX ADMIN — ATORVASTATIN CALCIUM 80 MG: 80 TABLET, FILM COATED ORAL at 17:13

## 2021-10-27 RX ADMIN — INSULIN LISPRO 1 UNITS: 100 INJECTION, SOLUTION INTRAVENOUS; SUBCUTANEOUS at 08:10

## 2021-10-27 RX ADMIN — CHLORHEXIDINE GLUCONATE 15 ML: 1.2 SOLUTION ORAL at 08:11

## 2021-10-27 RX ADMIN — FAMOTIDINE 20 MG: 40 POWDER, FOR SUSPENSION ORAL at 17:09

## 2021-10-27 RX ADMIN — MIDODRINE HYDROCHLORIDE 2.5 MG: 5 TABLET ORAL at 11:42

## 2021-10-27 RX ADMIN — FAMOTIDINE 20 MG: 40 POWDER, FOR SUSPENSION ORAL at 08:12

## 2021-10-28 PROBLEM — N39.498 NEUROGENIC URINARY INCONTINENCE: Status: ACTIVE | Noted: 2021-10-15

## 2021-10-28 LAB
ANION GAP SERPL CALCULATED.3IONS-SCNC: 3 MMOL/L (ref 4–13)
BUN SERPL-MCNC: 10 MG/DL (ref 5–25)
CALCIUM SERPL-MCNC: 8 MG/DL (ref 8.3–10.1)
CHLORIDE SERPL-SCNC: 109 MMOL/L (ref 100–108)
CO2 SERPL-SCNC: 29 MMOL/L (ref 21–32)
CREAT SERPL-MCNC: 0.91 MG/DL (ref 0.6–1.3)
ERYTHROCYTE [DISTWIDTH] IN BLOOD BY AUTOMATED COUNT: 15.9 % (ref 11.6–15.1)
FLUAV RNA RESP QL NAA+PROBE: NEGATIVE
FLUBV RNA RESP QL NAA+PROBE: NEGATIVE
GFR SERPL CREATININE-BSD FRML MDRD: 82 ML/MIN/1.73SQ M
GLUCOSE SERPL-MCNC: 118 MG/DL (ref 65–140)
GLUCOSE SERPL-MCNC: 134 MG/DL (ref 65–140)
GLUCOSE SERPL-MCNC: 141 MG/DL (ref 65–140)
GLUCOSE SERPL-MCNC: 75 MG/DL (ref 65–140)
GLUCOSE SERPL-MCNC: 99 MG/DL (ref 65–140)
HCT VFR BLD AUTO: 24.8 % (ref 36.5–49.3)
HGB BLD-MCNC: 7.7 G/DL (ref 12–17)
MCH RBC QN AUTO: 30.6 PG (ref 26.8–34.3)
MCHC RBC AUTO-ENTMCNC: 31 G/DL (ref 31.4–37.4)
MCV RBC AUTO: 98 FL (ref 82–98)
PLATELET # BLD AUTO: 157 THOUSANDS/UL (ref 149–390)
PMV BLD AUTO: 10.8 FL (ref 8.9–12.7)
POTASSIUM SERPL-SCNC: 3.3 MMOL/L (ref 3.5–5.3)
RBC # BLD AUTO: 2.52 MILLION/UL (ref 3.88–5.62)
RSV RNA RESP QL NAA+PROBE: NEGATIVE
SARS-COV-2 RNA RESP QL NAA+PROBE: NEGATIVE
SODIUM SERPL-SCNC: 141 MMOL/L (ref 136–145)
WBC # BLD AUTO: 6.79 THOUSAND/UL (ref 4.31–10.16)

## 2021-10-28 PROCEDURE — 97112 NEUROMUSCULAR REEDUCATION: CPT

## 2021-10-28 PROCEDURE — 80048 BASIC METABOLIC PNL TOTAL CA: CPT | Performed by: STUDENT IN AN ORGANIZED HEALTH CARE EDUCATION/TRAINING PROGRAM

## 2021-10-28 PROCEDURE — 82948 REAGENT STRIP/BLOOD GLUCOSE: CPT

## 2021-10-28 PROCEDURE — 97535 SELF CARE MNGMENT TRAINING: CPT

## 2021-10-28 PROCEDURE — 85027 COMPLETE CBC AUTOMATED: CPT | Performed by: STUDENT IN AN ORGANIZED HEALTH CARE EDUCATION/TRAINING PROGRAM

## 2021-10-28 PROCEDURE — 97530 THERAPEUTIC ACTIVITIES: CPT

## 2021-10-28 PROCEDURE — 99232 SBSQ HOSP IP/OBS MODERATE 35: CPT | Performed by: INTERNAL MEDICINE

## 2021-10-28 PROCEDURE — 0241U HB NFCT DS VIR RESP RNA 4 TRGT: CPT | Performed by: STUDENT IN AN ORGANIZED HEALTH CARE EDUCATION/TRAINING PROGRAM

## 2021-10-28 RX ORDER — MIDODRINE HYDROCHLORIDE 5 MG/1
5 TABLET ORAL
Status: DISCONTINUED | OUTPATIENT
Start: 2021-10-28 | End: 2021-10-29 | Stop reason: HOSPADM

## 2021-10-28 RX ADMIN — MIDODRINE HYDROCHLORIDE 2.5 MG: 5 TABLET ORAL at 08:12

## 2021-10-28 RX ADMIN — INSULIN GLARGINE 14 UNITS: 100 INJECTION, SOLUTION SUBCUTANEOUS at 08:20

## 2021-10-28 RX ADMIN — HEPARIN SODIUM 5000 UNITS: 5000 INJECTION INTRAVENOUS; SUBCUTANEOUS at 05:02

## 2021-10-28 RX ADMIN — INSULIN LISPRO 10 UNITS: 100 INJECTION, SOLUTION INTRAVENOUS; SUBCUTANEOUS at 08:20

## 2021-10-28 RX ADMIN — FAMOTIDINE 20 MG: 40 POWDER, FOR SUSPENSION ORAL at 08:15

## 2021-10-28 RX ADMIN — LEVOTHYROXINE SODIUM 150 MCG: 75 TABLET ORAL at 05:02

## 2021-10-28 RX ADMIN — ASPIRIN 81 MG CHEWABLE TABLET 81 MG: 81 TABLET CHEWABLE at 08:12

## 2021-10-28 RX ADMIN — CHLORHEXIDINE GLUCONATE 15 ML: 1.2 SOLUTION ORAL at 21:47

## 2021-10-28 RX ADMIN — CLOPIDOGREL BISULFATE 75 MG: 75 TABLET ORAL at 08:12

## 2021-10-28 RX ADMIN — ALBUMIN (HUMAN) 12.5 G: 0.25 INJECTION, SOLUTION INTRAVENOUS at 08:15

## 2021-10-28 RX ADMIN — HEPARIN SODIUM 5000 UNITS: 5000 INJECTION INTRAVENOUS; SUBCUTANEOUS at 21:46

## 2021-10-28 RX ADMIN — CHLORHEXIDINE GLUCONATE 15 ML: 1.2 SOLUTION ORAL at 08:12

## 2021-10-28 RX ADMIN — MIDODRINE HYDROCHLORIDE 2.5 MG: 5 TABLET ORAL at 12:30

## 2021-10-28 RX ADMIN — Medication 3 MG: at 21:46

## 2021-10-28 RX ADMIN — SODIUM CHLORIDE 500 ML: 0.9 INJECTION, SOLUTION INTRAVENOUS at 15:48

## 2021-10-29 VITALS
WEIGHT: 184 LBS | HEIGHT: 68 IN | TEMPERATURE: 98.6 F | HEART RATE: 108 BPM | OXYGEN SATURATION: 91 % | BODY MASS INDEX: 27.89 KG/M2 | DIASTOLIC BLOOD PRESSURE: 61 MMHG | RESPIRATION RATE: 20 BRPM | SYSTOLIC BLOOD PRESSURE: 93 MMHG

## 2021-10-29 PROBLEM — B96.1 BACTEREMIA DUE TO KLEBSIELLA PNEUMONIAE: Status: RESOLVED | Noted: 2021-10-17 | Resolved: 2021-10-29

## 2021-10-29 PROBLEM — E11.10 DKA (DIABETIC KETOACIDOSIS) (HCC): Status: RESOLVED | Noted: 2021-10-12 | Resolved: 2021-10-29

## 2021-10-29 PROBLEM — J18.9 PNA (PNEUMONIA): Status: RESOLVED | Noted: 2021-10-12 | Resolved: 2021-10-29

## 2021-10-29 PROBLEM — A41.9 SEPTIC SHOCK (HCC): Status: RESOLVED | Noted: 2021-10-12 | Resolved: 2021-10-29

## 2021-10-29 PROBLEM — J43.2 CENTRILOBULAR EMPHYSEMA (HCC): Status: RESOLVED | Noted: 2021-10-15 | Resolved: 2021-10-29

## 2021-10-29 PROBLEM — R77.8 ELEVATED TROPONIN: Status: RESOLVED | Noted: 2021-10-15 | Resolved: 2021-10-29

## 2021-10-29 PROBLEM — J96.01 ACUTE RESPIRATORY FAILURE WITH HYPOXIA (HCC): Status: RESOLVED | Noted: 2021-10-18 | Resolved: 2021-10-29

## 2021-10-29 PROBLEM — R65.21 SEPTIC SHOCK (HCC): Status: RESOLVED | Noted: 2021-10-12 | Resolved: 2021-10-29

## 2021-10-29 PROBLEM — R78.81 BACTEREMIA DUE TO KLEBSIELLA PNEUMONIAE: Status: RESOLVED | Noted: 2021-10-17 | Resolved: 2021-10-29

## 2021-10-29 LAB
ANION GAP SERPL CALCULATED.3IONS-SCNC: 5 MMOL/L (ref 4–13)
BUN SERPL-MCNC: 9 MG/DL (ref 5–25)
CALCIUM SERPL-MCNC: 7.9 MG/DL (ref 8.3–10.1)
CHLORIDE SERPL-SCNC: 109 MMOL/L (ref 100–108)
CO2 SERPL-SCNC: 27 MMOL/L (ref 21–32)
CREAT SERPL-MCNC: 0.88 MG/DL (ref 0.6–1.3)
ERYTHROCYTE [DISTWIDTH] IN BLOOD BY AUTOMATED COUNT: 16.4 % (ref 11.6–15.1)
GFR SERPL CREATININE-BSD FRML MDRD: 84 ML/MIN/1.73SQ M
GLUCOSE SERPL-MCNC: 103 MG/DL (ref 65–140)
GLUCOSE SERPL-MCNC: 110 MG/DL (ref 65–140)
GLUCOSE SERPL-MCNC: 112 MG/DL (ref 65–140)
GLUCOSE SERPL-MCNC: 122 MG/DL (ref 65–140)
HCT VFR BLD AUTO: 25.4 % (ref 36.5–49.3)
HGB BLD-MCNC: 8 G/DL (ref 12–17)
MCH RBC QN AUTO: 31.4 PG (ref 26.8–34.3)
MCHC RBC AUTO-ENTMCNC: 31.5 G/DL (ref 31.4–37.4)
MCV RBC AUTO: 100 FL (ref 82–98)
PLATELET # BLD AUTO: 222 THOUSANDS/UL (ref 149–390)
PMV BLD AUTO: 11.9 FL (ref 8.9–12.7)
POTASSIUM SERPL-SCNC: 3.6 MMOL/L (ref 3.5–5.3)
RBC # BLD AUTO: 2.55 MILLION/UL (ref 3.88–5.62)
SODIUM SERPL-SCNC: 141 MMOL/L (ref 136–145)
WBC # BLD AUTO: 7.35 THOUSAND/UL (ref 4.31–10.16)

## 2021-10-29 PROCEDURE — 82948 REAGENT STRIP/BLOOD GLUCOSE: CPT

## 2021-10-29 PROCEDURE — 85027 COMPLETE CBC AUTOMATED: CPT | Performed by: STUDENT IN AN ORGANIZED HEALTH CARE EDUCATION/TRAINING PROGRAM

## 2021-10-29 PROCEDURE — 99239 HOSP IP/OBS DSCHRG MGMT >30: CPT | Performed by: INTERNAL MEDICINE

## 2021-10-29 PROCEDURE — 99232 SBSQ HOSP IP/OBS MODERATE 35: CPT | Performed by: INTERNAL MEDICINE

## 2021-10-29 PROCEDURE — 80048 BASIC METABOLIC PNL TOTAL CA: CPT | Performed by: STUDENT IN AN ORGANIZED HEALTH CARE EDUCATION/TRAINING PROGRAM

## 2021-10-29 RX ORDER — INSULIN GLARGINE 100 [IU]/ML
12 INJECTION, SOLUTION SUBCUTANEOUS
Qty: 10 ML | Refills: 0
Start: 2021-10-29

## 2021-10-29 RX ORDER — ASPIRIN 81 MG/1
81 TABLET, CHEWABLE ORAL DAILY
Qty: 30 TABLET | Refills: 0
Start: 2021-10-30

## 2021-10-29 RX ORDER — LEVOTHYROXINE SODIUM 0.15 MG/1
150 TABLET ORAL
Qty: 30 TABLET | Refills: 0
Start: 2021-10-30

## 2021-10-29 RX ORDER — ATORVASTATIN CALCIUM 80 MG/1
80 TABLET, FILM COATED ORAL
Qty: 30 TABLET | Refills: 0
Start: 2021-10-29

## 2021-10-29 RX ORDER — FAMOTIDINE 40 MG/5ML
20 POWDER, FOR SUSPENSION ORAL 2 TIMES DAILY
Qty: 90 ML
Start: 2021-10-29 | End: 2021-11-28

## 2021-10-29 RX ORDER — INSULIN GLARGINE 100 [IU]/ML
12 INJECTION, SOLUTION SUBCUTANEOUS EVERY MORNING
Status: DISCONTINUED | OUTPATIENT
Start: 2021-10-30 | End: 2021-10-29 | Stop reason: HOSPADM

## 2021-10-29 RX ORDER — CLOPIDOGREL BISULFATE 75 MG/1
75 TABLET ORAL DAILY
Qty: 30 TABLET | Refills: 0
Start: 2021-10-30

## 2021-10-29 RX ORDER — ACETAMINOPHEN 160 MG/5ML
650 SUSPENSION, ORAL (FINAL DOSE FORM) ORAL EVERY 6 HOURS PRN
Refills: 0
Start: 2021-10-29

## 2021-10-29 RX ADMIN — HEPARIN SODIUM 5000 UNITS: 5000 INJECTION INTRAVENOUS; SUBCUTANEOUS at 05:10

## 2021-10-29 RX ADMIN — MIDODRINE HYDROCHLORIDE 5 MG: 5 TABLET ORAL at 11:57

## 2021-10-29 RX ADMIN — LEVOTHYROXINE SODIUM 150 MCG: 75 TABLET ORAL at 05:10

## 2021-10-29 RX ADMIN — ATORVASTATIN CALCIUM 80 MG: 80 TABLET, FILM COATED ORAL at 16:29

## 2021-10-29 RX ADMIN — DIBASIC SODIUM PHOSPHATE, MONOBASIC POTASSIUM PHOSPHATE AND MONOBASIC SODIUM PHOSPHATE 1 TABLET: 852; 155; 130 TABLET ORAL at 16:29

## 2021-10-29 RX ADMIN — INSULIN GLARGINE 14 UNITS: 100 INJECTION, SOLUTION SUBCUTANEOUS at 10:09

## 2021-10-29 RX ADMIN — MIDODRINE HYDROCHLORIDE 5 MG: 5 TABLET ORAL at 16:29

## 2021-10-29 RX ADMIN — CLOPIDOGREL BISULFATE 75 MG: 75 TABLET ORAL at 08:30

## 2021-10-29 RX ADMIN — ASPIRIN 81 MG CHEWABLE TABLET 81 MG: 81 TABLET CHEWABLE at 08:30

## 2021-11-26 NOTE — TELEPHONE ENCOUNTER
lmom for pt to return call to schedule MRI Fusion Bx Oriented - self; Oriented - place; Oriented - time

## 2021-11-29 ENCOUNTER — OFFICE VISIT (OUTPATIENT)
Dept: WOUND CARE | Facility: HOSPITAL | Age: 76
End: 2021-11-29
Payer: MEDICARE

## 2021-11-29 VITALS
SYSTOLIC BLOOD PRESSURE: 112 MMHG | RESPIRATION RATE: 16 BRPM | BODY MASS INDEX: 25.76 KG/M2 | TEMPERATURE: 96.8 F | DIASTOLIC BLOOD PRESSURE: 70 MMHG | WEIGHT: 169.4 LBS | HEART RATE: 104 BPM

## 2021-11-29 DIAGNOSIS — L89.610 PRESSURE INJURY OF RIGHT HEEL, UNSTAGEABLE (HCC): Primary | ICD-10-CM

## 2021-11-29 DIAGNOSIS — L89.153 PRESSURE INJURY OF SACRAL REGION, STAGE 3 (HCC): ICD-10-CM

## 2021-11-29 PROCEDURE — 99213 OFFICE O/P EST LOW 20 MIN: CPT | Performed by: SURGERY

## 2021-11-29 RX ORDER — INSULIN GLARGINE 100 [IU]/ML
12 INJECTION, SOLUTION SUBCUTANEOUS
COMMUNITY

## 2021-11-29 RX ORDER — ACETAMINOPHEN 325 MG/1
650 TABLET ORAL EVERY 6 HOURS PRN
COMMUNITY

## 2021-11-29 RX ORDER — CHOLECALCIFEROL (VITAMIN D3) 125 MCG
5 CAPSULE ORAL
COMMUNITY

## 2021-12-20 ENCOUNTER — OFFICE VISIT (OUTPATIENT)
Dept: WOUND CARE | Facility: HOSPITAL | Age: 76
End: 2021-12-20
Payer: MEDICARE

## 2021-12-20 VITALS
HEART RATE: 116 BPM | TEMPERATURE: 97.2 F | SYSTOLIC BLOOD PRESSURE: 105 MMHG | DIASTOLIC BLOOD PRESSURE: 63 MMHG | RESPIRATION RATE: 20 BRPM

## 2021-12-20 DIAGNOSIS — L89.610 PRESSURE INJURY OF RIGHT HEEL, UNSTAGEABLE (HCC): Primary | ICD-10-CM

## 2021-12-20 DIAGNOSIS — L89.153 PRESSURE INJURY OF SACRAL REGION, STAGE 3 (HCC): ICD-10-CM

## 2021-12-20 PROCEDURE — 99213 OFFICE O/P EST LOW 20 MIN: CPT | Performed by: SURGERY

## 2021-12-20 RX ORDER — LIDOCAINE 40 MG/G
CREAM TOPICAL ONCE
Status: COMPLETED | OUTPATIENT
Start: 2021-12-20 | End: 2021-12-20

## 2021-12-20 RX ADMIN — LIDOCAINE 1 APPLICATION: 40 CREAM TOPICAL at 13:55

## 2022-01-17 ENCOUNTER — OFFICE VISIT (OUTPATIENT)
Dept: WOUND CARE | Facility: HOSPITAL | Age: 77
End: 2022-01-17
Payer: MEDICARE

## 2022-01-17 VITALS
RESPIRATION RATE: 16 BRPM | HEART RATE: 111 BPM | TEMPERATURE: 96.6 F | SYSTOLIC BLOOD PRESSURE: 125 MMHG | DIASTOLIC BLOOD PRESSURE: 78 MMHG

## 2022-01-17 DIAGNOSIS — L89.610 PRESSURE INJURY OF RIGHT HEEL, UNSTAGEABLE (HCC): Primary | ICD-10-CM

## 2022-01-17 DIAGNOSIS — L89.153 PRESSURE INJURY OF SACRAL REGION, STAGE 3 (HCC): ICD-10-CM

## 2022-01-17 PROCEDURE — 99213 OFFICE O/P EST LOW 20 MIN: CPT | Performed by: SURGERY

## 2022-01-17 PROCEDURE — 11042 DBRDMT SUBQ TIS 1ST 20SQCM/<: CPT | Performed by: SURGERY

## 2022-01-17 RX ORDER — LIDOCAINE HYDROCHLORIDE 40 MG/ML
5 SOLUTION TOPICAL ONCE
Status: COMPLETED | OUTPATIENT
Start: 2022-01-17 | End: 2022-01-17

## 2022-01-17 RX ADMIN — LIDOCAINE HYDROCHLORIDE 5 ML: 40 SOLUTION TOPICAL at 10:36

## 2022-01-17 NOTE — PATIENT INSTRUCTIONS
Orders Placed This Encounter   Procedures    Wound cleansing and dressings     Sacral wound:  Wound care daily and prn strikethrough drainage, soilage or dislodgement  Cleanse with nss or wound cleanser, pat dry  Apply skin prep to periwound  Calcium alginate to wound  Cover with silicone bordered foam       Right heel wound:  Wound care daily and prn soilage/dislodgement  Cleanse gently with NSS moistened gauze  Apply Alginate to the wound then  Cover with gauze or ABD and secure gently with rolled gauze    ---------------MD does not want silicone bordered foam used on right heel------------    May continue foam on left heel for protection; no open wound           The above was applied today                                                            Standing Status:   Future     Standing Expiration Date:   1/17/2023    Wound off loading     Keep weight and pressure off wounds at all times  Wear off-loading prevalon boots as directed by your physician  Put on immediately when rising in the morning and remove when going to bed  Use Wheelchair Cushion  Seat lifts or shift position in chair every 15 minutes  Off Loading Mattress  Turn every 2 hours  Avoid position directing pressure to Wound site  Limit side lying to 30 degree tilt  Limit the head of bed elevation to 30 degrees  Do Not Sit for Long Periods of Time       Standing Status:   Future     Standing Expiration Date:   1/17/2023

## 2022-01-17 NOTE — PROGRESS NOTES
Patient ID: Mariel Jade is a 77 y o  male Date of Birth 1945       Chief Complaint   Patient presents with    Follow Up Wound Care Visit     Sacral and right Heel wound       Allergies:  Patient has no known allergies  Diagnosis:  1  Pressure injury of right heel, unstageable ([address] )  Assessment & Plan:  Top edge beginning to lift so this was debrided, will use alginate now    Orders:  -     lidocaine (XYLOCAINE) 4 % topical solution 5 mL  -     Wound cleansing and dressings; Future  -     Wound off loading; Future    2  Pressure injury of sacral region, stage 3 (HCC)  Assessment & Plan:  Measures slightly smaller, will continue alginate    Orders:  -     lidocaine (XYLOCAINE) 4 % topical solution 5 mL  -     Wound cleansing and dressings; Future  -     Wound off loading; Future     Diagnosis ICD-10-CM Associated Orders   1  Pressure injury of right heel, unstageable (HCC)  L89 610 lidocaine (XYLOCAINE) 4 % topical solution 5 mL     Wound cleansing and dressings     Wound off loading   2   Pressure injury of sacral region, stage 3 (HCC)  L89 153 lidocaine (XYLOCAINE) 4 % topical solution 5 mL     Wound cleansing and dressings     Wound off loading          Subjective:   HPI  Here for wound follow up  The following portions of the patient's history were reviewed and updated as appropriate:   Patient Active Problem List   Diagnosis    Malignant neoplasm of prostate ([address] )    Diabetes ([address] )    Heart disease    History of cardiac pacemaker    Type 2 diabetes mellitus with circulatory disorder, with long-term current use of insulin ([address] )    GERD (gastroesophageal reflux disease)    HTN (hypertension)    [facility]    Dysphagia    Pressure ulcer of right heel    Sacral ulcer ([address] )    Neurogenic urinary incontinence    Hypothyroidism    Vitamin D deficiency    Prostate cancer ([address] )    Hypotension    Pressure injury of sacral region, stage 3 ([address] )     Past Medical History:   Diagnosis Date    Acid reflux     Cardiac pacemaker     Diabetes ([facility] )     Dysphagia     Fungus infection     Rectal fungus    Heart disease     High cholesterol     Hypertension     Hypothyroidism     Prostate cancer ([facility] )     Unspecified dementia without behavioral disturbance (HCC)      Past Surgical History:   Procedure Laterality Date    BREAST CYST EXCISION      CARDIAC SURGERY      STENT    COLONOSCOPY       Social History     Socioeconomic History    Marital status:      Spouse name: None    Number of children: None    Years of education: None    Highest education level: None   Occupational History    None   Tobacco Use    Smoking status: Current Every Day Smoker     Packs/day: 0 50     Types: Cigarettes    Smokeless tobacco: Never Used   Substance and Sexual Activity    Alcohol use: Yes     Comment: Occasional wine    Drug use: No    Sexual activity: None   Other Topics Concern    None   Social History Narrative    None     Social Determinants of Health     Financial Resource Strain: Not on file   Food Insecurity: Not on file   Transportation Needs: Not on file   Physical Activity: Not on file   Stress: Not on file   Social Connections: Not on file   Intimate Partner Violence: Not on file   Housing Stability: Not on file        Current Outpatient Medications:     acetaminophen (TYLENOL) 160 mg/5 mL suspension, Take 20 3 mL (650 mg total) by mouth every 6 (six) hours as needed for mild pain or fever (Patient not taking: Reported on 11/29/2021 ), Disp: , Rfl: 0    acetaminophen (TYLENOL) 325 mg tablet, Take 650 mg by mouth every 6 (six) hours as needed for mild pain, Disp: , Rfl:     aspirin 81 mg chewable tablet, Chew 1 tablet (81 mg total) daily, Disp: 30 tablet, Rfl: 0    atorvastatin (LIPITOR) 80 mg tablet, Take 1 tablet (80 mg total) by mouth daily with dinner, Disp: 30 tablet, Rfl: 0    Cholecalciferol 1 25 MG (38074 UT) capsule, Take 50,000 Units by mouth daily, Disp: , Rfl:    clopidogrel (PLAVIX) 75 mg tablet, Take 1 tablet (75 mg total) by mouth daily, Disp: 30 tablet, Rfl: 0    Dapagliflozin Propanediol 10 MG TABS, Take 10 mg by mouth (Patient not taking: Reported on 11/29/2021 ), Disp: , Rfl:     Docusate Sodium (DSS) 100 MG CAPS, Take 100 mg by mouth 2 (two) times a day (Patient not taking: Reported on 11/29/2021 ), Disp: , Rfl:     Ergocalciferol (VITAMIN D2) 2000 units TABS, Take 50,000 Units by mouth (Patient not taking: Reported on 11/29/2021 ), Disp: , Rfl:     Evolocumab (Repatha SureClick) 818 MG/ML SOAJ, Inject 140 mg under the skin every 14 (fourteen) days (Patient not taking: Reported on 11/29/2021 ), Disp: , Rfl:     famotidine (PEPCID) 20 mg/2 5 mL oral suspension, Take 2 5 mL (20 mg total) by mouth 2 (two) times a day, Disp: 90 mL, Rfl:     glucagon (GLUCAGON EMERGENCY) 1 MG injection, Inject as directed as needed  , Disp: , Rfl:     insulin aspart (NovoLOG) 100 units/mL injection, Inject 100 Units/mL under the skin Pump  (Patient not taking: Reported on 11/29/2021 ), Disp: , Rfl:     insulin glargine (Basaglar KwikPen) 100 units/mL injection pen, Inject 12 Units under the skin, Disp: , Rfl:     insulin glargine (LANTUS) 100 units/mL subcutaneous injection, Inject 12 Units under the skin daily at bedtime (Patient not taking: Reported on 11/29/2021 ), Disp: 10 mL, Rfl: 0    insulin lispro (HumaLOG) 100 units/mL injection, Inject 8 Units under the skin 2 (two) times a day before lunch and dinner, Disp: 4 8 mL, Rfl: 0    levothyroxine 150 mcg tablet, Take 1 tablet (150 mcg total) by mouth daily in the early morning, Disp: 30 tablet, Rfl: 0    LORazepam (ATIVAN) 2 mg tablet, Take 1 tablet (2 mg total) by mouth every 8 (eight) hours as needed for anxiety (Patient not taking: Reported on 11/29/2021 ), Disp: 1 tablet, Rfl: 0    losartan (COZAAR) 100 MG tablet, Take 100 mg by mouth daily (Patient not taking: Reported on 11/29/2021 ), Disp: , Rfl:     losartan (COZAAR) 25 mg tablet, Take 25 mg by mouth 2 (two) times a day (Patient not taking: Reported on 11/29/2021 ), Disp: , Rfl: 1    Melatonin 5 MG TABS, Take 5 mg by mouth daily at bedtime, Disp: , Rfl:     metoprolol tartrate (LOPRESSOR) 25 mg tablet, Take 1 tablet (25 mg total) by mouth every 12 (twelve) hours, Disp: 30 tablet, Rfl: 0    omeprazole (PRILOSEC) 20 mg delayed release capsule, Reported on 2/14/2017 , Disp: , Rfl:     rosuvastatin (CRESTOR) 40 MG tablet, Take by mouth, Disp: , Rfl:     rosuvastatin (CRESTOR) 40 MG tablet, Take 40 mg by mouth daily (Patient not taking: Reported on 11/29/2021 ), Disp: , Rfl:     Semaglutide (Rybelsus) 7 MG TABS, Take 7 mg by mouth (Patient not taking: Reported on 11/29/2021 ), Disp: , Rfl:     senna-docusate sodium (SENOKOT S) 8 6-50 mg per tablet, Take 1 tablet by mouth 2 (two) times a day (Patient not taking: Reported on 11/29/2021 ), Disp: , Rfl:   No current facility-administered medications for this visit  Family History   Problem Relation Age of Onset    No Known Problems Father     No Known Problems Mother     Prostate cancer Brother       Review of Systems      Objective:  /78   Pulse (!) 111   Temp (!) 96 6 °F (35 9 °C) (Temporal)   Resp 16     Physical Exam        Wound 11/29/21 Pressure Injury Heel Right (Active)   Wound Image   01/17/22 1055   Wound Description Eschar;Yellow;Pink 01/17/22 1035   Pressure Injury Stage U 12/20/21 1352   Jackie-wound Assessment Intact 01/17/22 1035   Wound Length (cm) 4 cm 01/17/22 1035   Wound Width (cm) 4 cm 01/17/22 1035   Wound Depth (cm) 0 4 cm 01/17/22 1035   Wound Surface Area (cm^2) 16 cm^2 01/17/22 1035   Wound Volume (cm^3) 6 4 cm^3 01/17/22 1035   Calculated Wound Volume (cm^3) 6 4 cm^3 01/17/22 1035   Change in Wound Size % -105 13 01/17/22 1035   Drainage Amount Small 01/17/22 1035   Drainage Description Milky;Purulent; Tan 01/17/22 1035   Non-staged Wound Description Full thickness 01/17/22 1035 Dressing Status Intact 11/29/21 1330       Wound 11/29/21 Pressure Injury Sacrum (Active)   Wound Image   01/17/22 1034   Wound Description Epithelialization;Granulation tissue; Yellow;Pink 01/17/22 1034   Pressure Injury Stage 3 12/20/21 1350   Luis-wound Assessment Scar Tissue;Fragile 01/17/22 1034   Wound Length (cm) 3 7 cm 01/17/22 1034   Wound Width (cm) 1 9 cm 01/17/22 1034   Wound Depth (cm) 0 3 cm 01/17/22 1034   Wound Surface Area (cm^2) 7 03 cm^2 01/17/22 1034   Wound Volume (cm^3) 2 109 cm^3 01/17/22 1034   Calculated Wound Volume (cm^3) 2 11 cm^3 01/17/22 1034   Change in Wound Size % 63 37 01/17/22 1034   Undermining 0 4 01/17/22 1034   Undermining is depth extending from 5-7 oclock 01/17/22 1034   Drainage Amount Small 01/17/22 1034   Drainage Description Serosanguineous; Bloody 01/17/22 1034   Non-staged Wound Description Full thickness 01/17/22 1034   Dressing Status Intact 11/29/21 1329     Right heel mostly dry black eschar which is loose at 12 o'clock position, drainage noted at that site  Sacrum with open areas, cephalad open area has slough over base, wound edges rolled, luis wound clean and intact                    Debridement   Wound 11/29/21 Pressure Injury Heel Right    Universal Protocol:  Consent: Verbal consent not obtained  Written consent obtained  Risks and benefits: risks, benefits and alternatives were discussed  Consent given by: power of   Time out: Immediately prior to procedure a "time out" was called to verify the correct patient, procedure, equipment, support staff and site/side marked as required    Patient identity confirmed: verbally with patient      Performed by: physician  Debridement type: surgical  Level of debridement: subcutaneous tissue  Pain control: lidocaine 4%  Post-debridement measurements  Length (cm): 4  Width (cm): 4  Depth (cm): 0 4  Percent debrided: 10%  Surface Area (cm^2): 16  Area debrided (cm^2): 1 6  Volume (cm^3): 6 4  Tissue and other material debrided: subcutaneous tissue  Devitalized tissue debrided: eschar  Instrument(s) utilized: forceps and scissors  Bleeding: small  Hemostasis obtained with: pressure  Procedural pain (0-10): 3  Post-procedural pain: 0   Response to treatment: procedure was tolerated well                   Wound Instructions:  Orders Placed This Encounter   Procedures    Wound cleansing and dressings     Sacral wound:  Wound care daily and prn strikethrough drainage, soilage or dislodgement  Cleanse with nss or wound cleanser, pat dry  Apply skin prep to periwound  Calcium alginate to wound  Cover with silicone bordered foam       Right heel wound:  Wound care daily and prn soilage/dislodgement  Cleanse gently with NSS moistened gauze  Apply Alginate to the wound then  Cover with gauze or ABD and secure gently with rolled gauze    ---------------MD does not want silicone bordered foam used on right heel------------    May continue foam on left heel for protection; no open wound           The above was applied today                                                            Standing Status:   Future     Standing Expiration Date:   1/17/2023    Wound off loading     Keep weight and pressure off wounds at all times  Wear off-loading prevalon boots as directed by your physician  Put on immediately when rising in the morning and remove when going to bed  Use Wheelchair Cushion  Seat lifts or shift position in chair every 15 minutes  Off Loading Mattress  Turn every 2 hours  Avoid position directing pressure to Wound site  Limit side lying to 30 degree tilt  Limit the head of bed elevation to 30 degrees  Do Not Sit for Long Periods of Time  Standing Status:   Future     Standing Expiration Date:   1/17/2023    Debridement     This order was created via procedure documentation         Swapnil Rivera MD      Portions of the record may have been created with voice recognition software   Occasional wrong word or "sound a like" substitutions may have occurred due to the inherent limitations of voice recognition software  Read the chart carefully and recognize, using context, where substitutions have occurred